# Patient Record
Sex: FEMALE | HISPANIC OR LATINO | Employment: UNEMPLOYED | URBAN - METROPOLITAN AREA
[De-identification: names, ages, dates, MRNs, and addresses within clinical notes are randomized per-mention and may not be internally consistent; named-entity substitution may affect disease eponyms.]

---

## 2021-07-26 ENCOUNTER — APPOINTMENT (OUTPATIENT)
Dept: GENERAL RADIOLOGY | Facility: HOSPITAL | Age: 41
End: 2021-07-26

## 2021-07-26 LAB
ALBUMIN SERPL-MCNC: 3.9 G/DL (ref 3.5–5.2)
ALBUMIN/GLOB SERPL: 1.3 G/DL
ALP SERPL-CCNC: 85 U/L (ref 39–117)
ALT SERPL W P-5'-P-CCNC: 31 U/L (ref 1–33)
ANION GAP SERPL CALCULATED.3IONS-SCNC: 11.1 MMOL/L (ref 5–15)
AST SERPL-CCNC: 19 U/L (ref 1–32)
BASOPHILS # BLD AUTO: 0.04 10*3/MM3 (ref 0–0.2)
BASOPHILS NFR BLD AUTO: 0.4 % (ref 0–1.5)
BILIRUB SERPL-MCNC: 0.2 MG/DL (ref 0–1.2)
BUN SERPL-MCNC: 9 MG/DL (ref 6–20)
BUN/CREAT SERPL: 17.3 (ref 7–25)
CALCIUM SPEC-SCNC: 8.4 MG/DL (ref 8.6–10.5)
CHLORIDE SERPL-SCNC: 100 MMOL/L (ref 98–107)
CO2 SERPL-SCNC: 25.9 MMOL/L (ref 22–29)
CREAT SERPL-MCNC: 0.52 MG/DL (ref 0.57–1)
DEPRECATED RDW RBC AUTO: 40.8 FL (ref 37–54)
EOSINOPHIL # BLD AUTO: 0.09 10*3/MM3 (ref 0–0.4)
EOSINOPHIL NFR BLD AUTO: 0.9 % (ref 0.3–6.2)
ERYTHROCYTE [DISTWIDTH] IN BLOOD BY AUTOMATED COUNT: 13.2 % (ref 12.3–15.4)
GFR SERPL CREATININE-BSD FRML MDRD: 131 ML/MIN/1.73
GFR SERPL CREATININE-BSD FRML MDRD: >150 ML/MIN/1.73
GLOBULIN UR ELPH-MCNC: 3 GM/DL
GLUCOSE SERPL-MCNC: 220 MG/DL (ref 65–99)
HCG SERPL QL: NEGATIVE
HCT VFR BLD AUTO: 35.5 % (ref 34–46.6)
HGB BLD-MCNC: 11.9 G/DL (ref 12–15.9)
HOLD SPECIMEN: NORMAL
IMM GRANULOCYTES # BLD AUTO: 0.03 10*3/MM3 (ref 0–0.05)
IMM GRANULOCYTES NFR BLD AUTO: 0.3 % (ref 0–0.5)
LYMPHOCYTES # BLD AUTO: 3.37 10*3/MM3 (ref 0.7–3.1)
LYMPHOCYTES NFR BLD AUTO: 35.5 % (ref 19.6–45.3)
MCH RBC QN AUTO: 28.3 PG (ref 26.6–33)
MCHC RBC AUTO-ENTMCNC: 33.5 G/DL (ref 31.5–35.7)
MCV RBC AUTO: 84.5 FL (ref 79–97)
MONOCYTES # BLD AUTO: 0.47 10*3/MM3 (ref 0.1–0.9)
MONOCYTES NFR BLD AUTO: 4.9 % (ref 5–12)
NEUTROPHILS NFR BLD AUTO: 5.5 10*3/MM3 (ref 1.7–7)
NEUTROPHILS NFR BLD AUTO: 58 % (ref 42.7–76)
NRBC BLD AUTO-RTO: 0 /100 WBC (ref 0–0.2)
PLATELET # BLD AUTO: 266 10*3/MM3 (ref 140–450)
PMV BLD AUTO: 10 FL (ref 6–12)
POTASSIUM SERPL-SCNC: 3.2 MMOL/L (ref 3.5–5.2)
PROT SERPL-MCNC: 6.9 G/DL (ref 6–8.5)
QT INTERVAL: 364 MS
RBC # BLD AUTO: 4.2 10*6/MM3 (ref 3.77–5.28)
SODIUM SERPL-SCNC: 137 MMOL/L (ref 136–145)
TROPONIN T SERPL-MCNC: <0.01 NG/ML (ref 0–0.03)
WBC # BLD AUTO: 9.5 10*3/MM3 (ref 3.4–10.8)
WHOLE BLOOD HOLD SPECIMEN: NORMAL

## 2021-07-26 PROCEDURE — 84484 ASSAY OF TROPONIN QUANT: CPT | Performed by: EMERGENCY MEDICINE

## 2021-07-26 PROCEDURE — 71046 X-RAY EXAM CHEST 2 VIEWS: CPT

## 2021-07-26 PROCEDURE — 84703 CHORIONIC GONADOTROPIN ASSAY: CPT | Performed by: EMERGENCY MEDICINE

## 2021-07-26 PROCEDURE — 93010 ELECTROCARDIOGRAM REPORT: CPT | Performed by: INTERNAL MEDICINE

## 2021-07-26 PROCEDURE — 85025 COMPLETE CBC W/AUTO DIFF WBC: CPT | Performed by: EMERGENCY MEDICINE

## 2021-07-26 PROCEDURE — 93005 ELECTROCARDIOGRAM TRACING: CPT

## 2021-07-26 PROCEDURE — 80053 COMPREHEN METABOLIC PANEL: CPT | Performed by: EMERGENCY MEDICINE

## 2021-07-26 PROCEDURE — 99284 EMERGENCY DEPT VISIT MOD MDM: CPT

## 2021-07-26 PROCEDURE — 93005 ELECTROCARDIOGRAM TRACING: CPT | Performed by: EMERGENCY MEDICINE

## 2021-07-26 RX ORDER — SODIUM CHLORIDE 0.9 % (FLUSH) 0.9 %
10 SYRINGE (ML) INJECTION AS NEEDED
Status: DISCONTINUED | OUTPATIENT
Start: 2021-07-26 | End: 2021-07-29 | Stop reason: HOSPADM

## 2021-07-26 RX ORDER — ASPIRIN 325 MG
325 TABLET ORAL ONCE
Status: DISCONTINUED | OUTPATIENT
Start: 2021-07-26 | End: 2021-07-27

## 2021-07-27 ENCOUNTER — APPOINTMENT (OUTPATIENT)
Dept: CT IMAGING | Facility: HOSPITAL | Age: 41
End: 2021-07-27

## 2021-07-27 ENCOUNTER — HOSPITAL ENCOUNTER (INPATIENT)
Facility: HOSPITAL | Age: 41
LOS: 2 days | Discharge: HOME | End: 2021-07-29
Attending: EMERGENCY MEDICINE | Admitting: INTERNAL MEDICINE

## 2021-07-27 ENCOUNTER — APPOINTMENT (OUTPATIENT)
Dept: MRI IMAGING | Facility: HOSPITAL | Age: 41
End: 2021-07-27

## 2021-07-27 DIAGNOSIS — G93.89 BRAIN MASS: Primary | ICD-10-CM

## 2021-07-27 DIAGNOSIS — R29.810 FACIAL DROOP: ICD-10-CM

## 2021-07-27 DIAGNOSIS — I10 ESSENTIAL HYPERTENSION: ICD-10-CM

## 2021-07-27 DIAGNOSIS — E78.5 HYPERLIPIDEMIA, UNSPECIFIED HYPERLIPIDEMIA TYPE: ICD-10-CM

## 2021-07-27 DIAGNOSIS — R20.0 LEFT ARM NUMBNESS: ICD-10-CM

## 2021-07-27 DIAGNOSIS — E11.9 TYPE 2 DIABETES MELLITUS WITHOUT COMPLICATION, WITHOUT LONG-TERM CURRENT USE OF INSULIN (HCC): ICD-10-CM

## 2021-07-27 DIAGNOSIS — R47.81 SLURRED SPEECH: ICD-10-CM

## 2021-07-27 PROBLEM — I63.9 ACUTE CVA (CEREBROVASCULAR ACCIDENT) (HCC): Status: ACTIVE | Noted: 2021-07-27

## 2021-07-27 LAB
ALBUMIN SERPL-MCNC: 3.8 G/DL (ref 3.5–5.2)
ALBUMIN/GLOB SERPL: 1.3 G/DL
ALP SERPL-CCNC: 89 U/L (ref 39–117)
ALT SERPL W P-5'-P-CCNC: 36 U/L (ref 1–33)
ANION GAP SERPL CALCULATED.3IONS-SCNC: 12.7 MMOL/L (ref 5–15)
ANION GAP SERPL CALCULATED.3IONS-SCNC: 9.5 MMOL/L (ref 5–15)
AST SERPL-CCNC: 25 U/L (ref 1–32)
BASOPHILS # BLD AUTO: 0.03 10*3/MM3 (ref 0–0.2)
BASOPHILS NFR BLD AUTO: 0.3 % (ref 0–1.5)
BILIRUB SERPL-MCNC: 0.2 MG/DL (ref 0–1.2)
BUN SERPL-MCNC: 8 MG/DL (ref 6–20)
BUN SERPL-MCNC: 9 MG/DL (ref 6–20)
BUN/CREAT SERPL: 14.5 (ref 7–25)
BUN/CREAT SERPL: 17 (ref 7–25)
CALCIUM SPEC-SCNC: 8.5 MG/DL (ref 8.6–10.5)
CALCIUM SPEC-SCNC: 8.7 MG/DL (ref 8.6–10.5)
CHLORIDE SERPL-SCNC: 101 MMOL/L (ref 98–107)
CHLORIDE SERPL-SCNC: 101 MMOL/L (ref 98–107)
CO2 SERPL-SCNC: 21.3 MMOL/L (ref 22–29)
CO2 SERPL-SCNC: 23.5 MMOL/L (ref 22–29)
CREAT SERPL-MCNC: 0.47 MG/DL (ref 0.57–1)
CREAT SERPL-MCNC: 0.62 MG/DL (ref 0.57–1)
DEPRECATED RDW RBC AUTO: 40 FL (ref 37–54)
DEPRECATED RDW RBC AUTO: 42.3 FL (ref 37–54)
EOSINOPHIL # BLD AUTO: 0.01 10*3/MM3 (ref 0–0.4)
EOSINOPHIL NFR BLD AUTO: 0.1 % (ref 0.3–6.2)
ERYTHROCYTE [DISTWIDTH] IN BLOOD BY AUTOMATED COUNT: 13 % (ref 12.3–15.4)
ERYTHROCYTE [DISTWIDTH] IN BLOOD BY AUTOMATED COUNT: 13.1 % (ref 12.3–15.4)
GFR SERPL CREATININE-BSD FRML MDRD: 107 ML/MIN/1.73
GFR SERPL CREATININE-BSD FRML MDRD: 129 ML/MIN/1.73
GFR SERPL CREATININE-BSD FRML MDRD: 147 ML/MIN/1.73
GFR SERPL CREATININE-BSD FRML MDRD: >150 ML/MIN/1.73
GLOBULIN UR ELPH-MCNC: 2.9 GM/DL
GLUCOSE BLDC GLUCOMTR-MCNC: 270 MG/DL (ref 70–130)
GLUCOSE BLDC GLUCOMTR-MCNC: 279 MG/DL (ref 70–130)
GLUCOSE BLDC GLUCOMTR-MCNC: 302 MG/DL (ref 70–130)
GLUCOSE BLDC GLUCOMTR-MCNC: 304 MG/DL (ref 70–130)
GLUCOSE SERPL-MCNC: 270 MG/DL (ref 65–99)
GLUCOSE SERPL-MCNC: 293 MG/DL (ref 65–99)
HCT VFR BLD AUTO: 38.2 % (ref 34–46.6)
HCT VFR BLD AUTO: 39.1 % (ref 34–46.6)
HGB BLD-MCNC: 12.3 G/DL (ref 12–15.9)
HGB BLD-MCNC: 12.7 G/DL (ref 12–15.9)
IMM GRANULOCYTES # BLD AUTO: 0.05 10*3/MM3 (ref 0–0.05)
IMM GRANULOCYTES NFR BLD AUTO: 0.6 % (ref 0–0.5)
INR PPP: 1.12 (ref 0.9–1.1)
LYMPHOCYTES # BLD AUTO: 1.42 10*3/MM3 (ref 0.7–3.1)
LYMPHOCYTES NFR BLD AUTO: 15.6 % (ref 19.6–45.3)
MCH RBC QN AUTO: 27.6 PG (ref 26.6–33)
MCH RBC QN AUTO: 28.5 PG (ref 26.6–33)
MCHC RBC AUTO-ENTMCNC: 31.5 G/DL (ref 31.5–35.7)
MCHC RBC AUTO-ENTMCNC: 33.2 G/DL (ref 31.5–35.7)
MCV RBC AUTO: 85.7 FL (ref 79–97)
MCV RBC AUTO: 87.7 FL (ref 79–97)
MONOCYTES # BLD AUTO: 0.13 10*3/MM3 (ref 0.1–0.9)
MONOCYTES NFR BLD AUTO: 1.4 % (ref 5–12)
NEUTROPHILS NFR BLD AUTO: 7.44 10*3/MM3 (ref 1.7–7)
NEUTROPHILS NFR BLD AUTO: 82 % (ref 42.7–76)
NRBC BLD AUTO-RTO: 0 /100 WBC (ref 0–0.2)
PLATELET # BLD AUTO: 286 10*3/MM3 (ref 140–450)
PLATELET # BLD AUTO: 299 10*3/MM3 (ref 140–450)
PMV BLD AUTO: 10.3 FL (ref 6–12)
PMV BLD AUTO: 9.8 FL (ref 6–12)
POTASSIUM SERPL-SCNC: 3.7 MMOL/L (ref 3.5–5.2)
POTASSIUM SERPL-SCNC: 4 MMOL/L (ref 3.5–5.2)
PROT SERPL-MCNC: 6.7 G/DL (ref 6–8.5)
PROTHROMBIN TIME: 14.2 SECONDS (ref 11.7–14.2)
RBC # BLD AUTO: 4.46 10*6/MM3 (ref 3.77–5.28)
RBC # BLD AUTO: 4.46 10*6/MM3 (ref 3.77–5.28)
SARS-COV-2 ORF1AB RESP QL NAA+PROBE: NOT DETECTED
SODIUM SERPL-SCNC: 134 MMOL/L (ref 136–145)
SODIUM SERPL-SCNC: 135 MMOL/L (ref 136–145)
TROPONIN T SERPL-MCNC: <0.01 NG/ML (ref 0–0.03)
WBC # BLD AUTO: 13.66 10*3/MM3 (ref 3.4–10.8)
WBC # BLD AUTO: 9.08 10*3/MM3 (ref 3.4–10.8)

## 2021-07-27 PROCEDURE — 70496 CT ANGIOGRAPHY HEAD: CPT

## 2021-07-27 PROCEDURE — A9577 INJ MULTIHANCE: HCPCS | Performed by: INTERNAL MEDICINE

## 2021-07-27 PROCEDURE — 25010000002 DEXAMETHASONE SODIUM PHOSPHATE 10 MG/ML SOLUTION: Performed by: PHYSICIAN ASSISTANT

## 2021-07-27 PROCEDURE — 99253 IP/OBS CNSLTJ NEW/EST LOW 45: CPT | Performed by: NURSE PRACTITIONER

## 2021-07-27 PROCEDURE — 70553 MRI BRAIN STEM W/O & W/DYE: CPT

## 2021-07-27 PROCEDURE — 85610 PROTHROMBIN TIME: CPT | Performed by: NURSE PRACTITIONER

## 2021-07-27 PROCEDURE — 70498 CT ANGIOGRAPHY NECK: CPT

## 2021-07-27 PROCEDURE — 82962 GLUCOSE BLOOD TEST: CPT

## 2021-07-27 PROCEDURE — 0 GADOBENATE DIMEGLUMINE 529 MG/ML SOLUTION: Performed by: INTERNAL MEDICINE

## 2021-07-27 PROCEDURE — 70450 CT HEAD/BRAIN W/O DYE: CPT

## 2021-07-27 PROCEDURE — 25010000003 LEVETIRACETAM IN NACL 0.75% 1000 MG/100ML SOLUTION: Performed by: NURSE PRACTITIONER

## 2021-07-27 PROCEDURE — 25010000003 LEVETIRACETAM IN NACL 0.75% 1000 MG/100ML SOLUTION: Performed by: PHYSICIAN ASSISTANT

## 2021-07-27 PROCEDURE — 63710000001 INSULIN LISPRO (HUMAN) PER 5 UNITS: Performed by: NURSE PRACTITIONER

## 2021-07-27 PROCEDURE — 92610 EVALUATE SWALLOWING FUNCTION: CPT

## 2021-07-27 PROCEDURE — 80053 COMPREHEN METABOLIC PANEL: CPT | Performed by: NURSE PRACTITIONER

## 2021-07-27 PROCEDURE — 74177 CT ABD & PELVIS W/CONTRAST: CPT

## 2021-07-27 PROCEDURE — U0004 COV-19 TEST NON-CDC HGH THRU: HCPCS | Performed by: PHYSICIAN ASSISTANT

## 2021-07-27 PROCEDURE — 25010000002 DEXAMETHASONE SODIUM PHOSPHATE 10 MG/ML SOLUTION: Performed by: NURSE PRACTITIONER

## 2021-07-27 PROCEDURE — 63710000001 INSULIN GLARGINE PER 5 UNITS: Performed by: INTERNAL MEDICINE

## 2021-07-27 PROCEDURE — 85025 COMPLETE CBC W/AUTO DIFF WBC: CPT | Performed by: NURSE PRACTITIONER

## 2021-07-27 PROCEDURE — 25010000002 IOPAMIDOL 61 % SOLUTION: Performed by: INTERNAL MEDICINE

## 2021-07-27 PROCEDURE — 36415 COLL VENOUS BLD VENIPUNCTURE: CPT | Performed by: NURSE PRACTITIONER

## 2021-07-27 PROCEDURE — 85027 COMPLETE CBC AUTOMATED: CPT | Performed by: INTERNAL MEDICINE

## 2021-07-27 PROCEDURE — 84484 ASSAY OF TROPONIN QUANT: CPT | Performed by: EMERGENCY MEDICINE

## 2021-07-27 RX ORDER — SODIUM CHLORIDE 9 MG/ML
125 INJECTION, SOLUTION INTRAVENOUS CONTINUOUS
Status: DISCONTINUED | OUTPATIENT
Start: 2021-07-27 | End: 2021-07-29 | Stop reason: HOSPADM

## 2021-07-27 RX ORDER — DEXTROSE MONOHYDRATE 25 G/50ML
25 INJECTION, SOLUTION INTRAVENOUS
Status: DISCONTINUED | OUTPATIENT
Start: 2021-07-27 | End: 2021-07-29 | Stop reason: HOSPADM

## 2021-07-27 RX ORDER — ASPIRIN 325 MG
325 TABLET ORAL DAILY
Status: DISCONTINUED | OUTPATIENT
Start: 2021-07-27 | End: 2021-07-28

## 2021-07-27 RX ORDER — LEVETIRACETAM 10 MG/ML
1000 INJECTION INTRAVASCULAR EVERY 12 HOURS SCHEDULED
Status: DISCONTINUED | OUTPATIENT
Start: 2021-07-27 | End: 2021-07-28

## 2021-07-27 RX ORDER — AMLODIPINE BESYLATE 10 MG/1
10 TABLET ORAL DAILY
COMMUNITY

## 2021-07-27 RX ORDER — NICOTINE POLACRILEX 4 MG
15 LOZENGE BUCCAL
Status: DISCONTINUED | OUTPATIENT
Start: 2021-07-27 | End: 2021-07-29 | Stop reason: HOSPADM

## 2021-07-27 RX ORDER — PANTOPRAZOLE SODIUM 40 MG/1
40 TABLET, DELAYED RELEASE ORAL EVERY MORNING
Status: DISCONTINUED | OUTPATIENT
Start: 2021-07-28 | End: 2021-07-29 | Stop reason: HOSPADM

## 2021-07-27 RX ORDER — OMEPRAZOLE 40 MG/1
40 CAPSULE, DELAYED RELEASE ORAL
COMMUNITY
Start: 2021-07-18 | End: 2021-11-10

## 2021-07-27 RX ORDER — LOSARTAN POTASSIUM AND HYDROCHLOROTHIAZIDE 12.5; 5 MG/1; MG/1
TABLET ORAL
COMMUNITY
Start: 2021-06-15

## 2021-07-27 RX ORDER — ASPIRIN 300 MG/1
300 SUPPOSITORY RECTAL DAILY
Status: DISCONTINUED | OUTPATIENT
Start: 2021-07-27 | End: 2021-07-28

## 2021-07-27 RX ORDER — SODIUM CHLORIDE 0.9 % (FLUSH) 0.9 %
10 SYRINGE (ML) INJECTION AS NEEDED
Status: DISCONTINUED | OUTPATIENT
Start: 2021-07-27 | End: 2021-07-27

## 2021-07-27 RX ORDER — DEXAMETHASONE SODIUM PHOSPHATE 10 MG/ML
6 INJECTION, SOLUTION INTRAMUSCULAR; INTRAVENOUS EVERY 6 HOURS
Status: DISCONTINUED | OUTPATIENT
Start: 2021-07-27 | End: 2021-07-28

## 2021-07-27 RX ORDER — SODIUM CHLORIDE 0.9 % (FLUSH) 0.9 %
10 SYRINGE (ML) INJECTION EVERY 12 HOURS SCHEDULED
Status: DISCONTINUED | OUTPATIENT
Start: 2021-07-27 | End: 2021-07-27

## 2021-07-27 RX ORDER — LEVETIRACETAM 10 MG/ML
1000 INJECTION INTRAVASCULAR ONCE
Status: COMPLETED | OUTPATIENT
Start: 2021-07-27 | End: 2021-07-27

## 2021-07-27 RX ORDER — ATORVASTATIN CALCIUM 80 MG/1
80 TABLET, FILM COATED ORAL NIGHTLY
Status: DISCONTINUED | OUTPATIENT
Start: 2021-07-27 | End: 2021-07-29 | Stop reason: HOSPADM

## 2021-07-27 RX ORDER — INSULIN LISPRO 100 [IU]/ML
0-14 INJECTION, SOLUTION INTRAVENOUS; SUBCUTANEOUS
Status: DISCONTINUED | OUTPATIENT
Start: 2021-07-27 | End: 2021-07-29

## 2021-07-27 RX ORDER — AMLODIPINE BESYLATE 10 MG/1
10 TABLET ORAL DAILY
Status: DISCONTINUED | OUTPATIENT
Start: 2021-07-27 | End: 2021-07-29 | Stop reason: HOSPADM

## 2021-07-27 RX ORDER — NICOTINE POLACRILEX 4 MG
15 LOZENGE BUCCAL
Status: DISCONTINUED | OUTPATIENT
Start: 2021-07-27 | End: 2021-07-27

## 2021-07-27 RX ORDER — DEXTROSE MONOHYDRATE 25 G/50ML
25 INJECTION, SOLUTION INTRAVENOUS
Status: DISCONTINUED | OUTPATIENT
Start: 2021-07-27 | End: 2021-07-27

## 2021-07-27 RX ORDER — INSULIN LISPRO 100 [IU]/ML
0-7 INJECTION, SOLUTION INTRAVENOUS; SUBCUTANEOUS
Status: DISCONTINUED | OUTPATIENT
Start: 2021-07-27 | End: 2021-07-27

## 2021-07-27 RX ORDER — SODIUM CHLORIDE 0.9 % (FLUSH) 0.9 %
10 SYRINGE (ML) INJECTION EVERY 12 HOURS SCHEDULED
Status: DISCONTINUED | OUTPATIENT
Start: 2021-07-27 | End: 2021-07-29 | Stop reason: HOSPADM

## 2021-07-27 RX ORDER — ACETAMINOPHEN 325 MG/1
650 TABLET ORAL EVERY 4 HOURS PRN
Status: DISCONTINUED | OUTPATIENT
Start: 2021-07-27 | End: 2021-07-29 | Stop reason: HOSPADM

## 2021-07-27 RX ORDER — NITROGLYCERIN 0.4 MG/1
0.4 TABLET SUBLINGUAL
Status: DISCONTINUED | OUTPATIENT
Start: 2021-07-27 | End: 2021-07-28 | Stop reason: SDUPTHER

## 2021-07-27 RX ORDER — ATORVASTATIN CALCIUM 20 MG/1
20 TABLET, FILM COATED ORAL DAILY
Status: DISCONTINUED | OUTPATIENT
Start: 2021-07-27 | End: 2021-07-27

## 2021-07-27 RX ORDER — ONDANSETRON 2 MG/ML
4 INJECTION INTRAMUSCULAR; INTRAVENOUS EVERY 6 HOURS PRN
Status: DISCONTINUED | OUTPATIENT
Start: 2021-07-27 | End: 2021-07-29 | Stop reason: HOSPADM

## 2021-07-27 RX ORDER — SODIUM CHLORIDE 0.9 % (FLUSH) 0.9 %
10 SYRINGE (ML) INJECTION AS NEEDED
Status: DISCONTINUED | OUTPATIENT
Start: 2021-07-27 | End: 2021-07-29 | Stop reason: HOSPADM

## 2021-07-27 RX ORDER — INSULIN GLARGINE 100 [IU]/ML
20 INJECTION, SOLUTION SUBCUTANEOUS NIGHTLY
Status: DISCONTINUED | OUTPATIENT
Start: 2021-07-27 | End: 2021-07-29 | Stop reason: HOSPADM

## 2021-07-27 RX ORDER — DEXAMETHASONE SODIUM PHOSPHATE 10 MG/ML
8 INJECTION, SOLUTION INTRAMUSCULAR; INTRAVENOUS ONCE
Status: COMPLETED | OUTPATIENT
Start: 2021-07-27 | End: 2021-07-27

## 2021-07-27 RX ADMIN — AMLODIPINE BESYLATE 10 MG: 10 TABLET ORAL at 16:17

## 2021-07-27 RX ADMIN — IOPAMIDOL 85 ML: 612 INJECTION, SOLUTION INTRAVENOUS at 10:30

## 2021-07-27 RX ADMIN — LEVETIRACETAM 1000 MG: 1000 INJECTION, SOLUTION INTRAVENOUS at 03:15

## 2021-07-27 RX ADMIN — LOSARTAN POTASSIUM: 50 TABLET, FILM COATED ORAL at 16:17

## 2021-07-27 RX ADMIN — SODIUM CHLORIDE, PRESERVATIVE FREE 10 ML: 5 INJECTION INTRAVENOUS at 10:11

## 2021-07-27 RX ADMIN — LEVETIRACETAM 1000 MG: 1000 INJECTION, SOLUTION INTRAVENOUS at 10:10

## 2021-07-27 RX ADMIN — ASPIRIN 325 MG: 325 TABLET ORAL at 16:17

## 2021-07-27 RX ADMIN — GADOBENATE DIMEGLUMINE 20 ML: 529 INJECTION, SOLUTION INTRAVENOUS at 14:05

## 2021-07-27 RX ADMIN — DEXAMETHASONE SODIUM PHOSPHATE 8 MG: 10 INJECTION, SOLUTION INTRAMUSCULAR; INTRAVENOUS at 03:15

## 2021-07-27 RX ADMIN — DEXAMETHASONE SODIUM PHOSPHATE 6 MG: 10 INJECTION, SOLUTION INTRAMUSCULAR; INTRAVENOUS at 10:51

## 2021-07-27 RX ADMIN — INSULIN LISPRO 5 UNITS: 100 INJECTION, SOLUTION INTRAVENOUS; SUBCUTANEOUS at 17:58

## 2021-07-27 RX ADMIN — DEXAMETHASONE SODIUM PHOSPHATE 6 MG: 10 INJECTION, SOLUTION INTRAMUSCULAR; INTRAVENOUS at 16:17

## 2021-07-27 RX ADMIN — INSULIN GLARGINE 20 UNITS: 100 INJECTION, SOLUTION SUBCUTANEOUS at 21:38

## 2021-07-27 RX ADMIN — LEVETIRACETAM 1000 MG: 1000 INJECTION, SOLUTION INTRAVENOUS at 21:33

## 2021-07-27 RX ADMIN — ATORVASTATIN CALCIUM 80 MG: 80 TABLET, FILM COATED ORAL at 21:33

## 2021-07-27 RX ADMIN — INSULIN LISPRO 4 UNITS: 100 INJECTION, SOLUTION INTRAVENOUS; SUBCUTANEOUS at 10:11

## 2021-07-27 RX ADMIN — SODIUM CHLORIDE 100 ML/HR: 9 INJECTION, SOLUTION INTRAVENOUS at 16:17

## 2021-07-27 RX ADMIN — SODIUM CHLORIDE, PRESERVATIVE FREE 10 ML: 5 INJECTION INTRAVENOUS at 21:36

## 2021-07-27 RX ADMIN — DEXAMETHASONE SODIUM PHOSPHATE 6 MG: 10 INJECTION, SOLUTION INTRAMUSCULAR; INTRAVENOUS at 21:35

## 2021-07-27 RX ADMIN — IOPAMIDOL 85 ML: 612 INJECTION, SOLUTION INTRAVENOUS at 23:43

## 2021-07-27 RX ADMIN — ACETAMINOPHEN 650 MG: 325 TABLET, FILM COATED ORAL at 21:33

## 2021-07-28 ENCOUNTER — APPOINTMENT (OUTPATIENT)
Dept: CARDIOLOGY | Facility: HOSPITAL | Age: 41
End: 2021-07-28

## 2021-07-28 ENCOUNTER — APPOINTMENT (OUTPATIENT)
Dept: MRI IMAGING | Facility: HOSPITAL | Age: 41
End: 2021-07-28

## 2021-07-28 PROBLEM — R93.2 ABNORMAL LIVER CT: Status: ACTIVE | Noted: 2021-07-28

## 2021-07-28 LAB
ALBUMIN SERPL-MCNC: 3.7 G/DL (ref 3.5–5.2)
ALBUMIN/GLOB SERPL: 1.3 G/DL
ALP SERPL-CCNC: 83 U/L (ref 39–117)
ALT SERPL W P-5'-P-CCNC: 29 U/L (ref 1–33)
ANION GAP SERPL CALCULATED.3IONS-SCNC: 11.6 MMOL/L (ref 5–15)
AORTIC DIMENSIONLESS INDEX: 0.9 (DI)
ASCENDING AORTA: 3 CM
AST SERPL-CCNC: 14 U/L (ref 1–32)
BH CV ECHO MEAS - ACS: 2.1 CM
BH CV ECHO MEAS - AO MAX PG (FULL): 3.7 MMHG
BH CV ECHO MEAS - AO MAX PG: 9.6 MMHG
BH CV ECHO MEAS - AO MEAN PG (FULL): 2 MMHG
BH CV ECHO MEAS - AO MEAN PG: 5 MMHG
BH CV ECHO MEAS - AO ROOT AREA (BSA CORRECTED): 1.3
BH CV ECHO MEAS - AO ROOT AREA: 5.7 CM^2
BH CV ECHO MEAS - AO ROOT DIAM: 2.7 CM
BH CV ECHO MEAS - AO V2 MAX: 155 CM/SEC
BH CV ECHO MEAS - AO V2 MEAN: 108 CM/SEC
BH CV ECHO MEAS - AO V2 VTI: 34.1 CM
BH CV ECHO MEAS - ASC AORTA: 3 CM
BH CV ECHO MEAS - AVA(I,A): 2.7 CM^2
BH CV ECHO MEAS - AVA(I,D): 2.7 CM^2
BH CV ECHO MEAS - AVA(V,A): 2.7 CM^2
BH CV ECHO MEAS - AVA(V,D): 2.7 CM^2
BH CV ECHO MEAS - BSA(HAYCOCK): 2.3 M^2
BH CV ECHO MEAS - BSA: 2.1 M^2
BH CV ECHO MEAS - BZI_BMI: 41.2 KILOGRAMS/M^2
BH CV ECHO MEAS - BZI_METRIC_HEIGHT: 162.6 CM
BH CV ECHO MEAS - BZI_METRIC_WEIGHT: 108.9 KG
BH CV ECHO MEAS - EDV(CUBED): 140.6 ML
BH CV ECHO MEAS - EDV(MOD-SP2): 114 ML
BH CV ECHO MEAS - EDV(MOD-SP4): 120 ML
BH CV ECHO MEAS - EDV(TEICH): 129.5 ML
BH CV ECHO MEAS - EF(CUBED): 78.8 %
BH CV ECHO MEAS - EF(MOD-BP): 59.6 %
BH CV ECHO MEAS - EF(MOD-SP2): 64 %
BH CV ECHO MEAS - EF(MOD-SP4): 58.3 %
BH CV ECHO MEAS - EF(TEICH): 70.7 %
BH CV ECHO MEAS - ESV(CUBED): 29.8 ML
BH CV ECHO MEAS - ESV(MOD-SP2): 41 ML
BH CV ECHO MEAS - ESV(MOD-SP4): 50 ML
BH CV ECHO MEAS - ESV(TEICH): 37.9 ML
BH CV ECHO MEAS - FS: 40.4 %
BH CV ECHO MEAS - IVS/LVPW: 1.1
BH CV ECHO MEAS - IVSD: 1.1 CM
BH CV ECHO MEAS - LAT PEAK E' VEL: 10 CM/SEC
BH CV ECHO MEAS - LV DIASTOLIC VOL/BSA (35-75): 56.8 ML/M^2
BH CV ECHO MEAS - LV MASS(C)D: 234.6 GRAMS
BH CV ECHO MEAS - LV MASS(C)DI: 111 GRAMS/M^2
BH CV ECHO MEAS - LV MAX PG: 6 MMHG
BH CV ECHO MEAS - LV MEAN PG: 3 MMHG
BH CV ECHO MEAS - LV SYSTOLIC VOL/BSA (12-30): 23.7 ML/M^2
BH CV ECHO MEAS - LV V1 MAX: 122 CM/SEC
BH CV ECHO MEAS - LV V1 MEAN: 82.2 CM/SEC
BH CV ECHO MEAS - LV V1 VTI: 26.7 CM
BH CV ECHO MEAS - LVIDD: 5.2 CM
BH CV ECHO MEAS - LVIDS: 3.1 CM
BH CV ECHO MEAS - LVLD AP2: 8.1 CM
BH CV ECHO MEAS - LVLD AP4: 8.2 CM
BH CV ECHO MEAS - LVLS AP2: 6.4 CM
BH CV ECHO MEAS - LVLS AP4: 7 CM
BH CV ECHO MEAS - LVOT AREA (M): 3.5 CM^2
BH CV ECHO MEAS - LVOT AREA: 3.5 CM^2
BH CV ECHO MEAS - LVOT DIAM: 2.1 CM
BH CV ECHO MEAS - LVPWD: 1.1 CM
BH CV ECHO MEAS - MED PEAK E' VEL: 11.1 CM/SEC
BH CV ECHO MEAS - MV A DUR: 0.2 SEC
BH CV ECHO MEAS - MV A MAX VEL: 63.8 CM/SEC
BH CV ECHO MEAS - MV DEC SLOPE: 438 CM/SEC^2
BH CV ECHO MEAS - MV DEC TIME: 246 SEC
BH CV ECHO MEAS - MV E MAX VEL: 93.8 CM/SEC
BH CV ECHO MEAS - MV E/A: 1.5
BH CV ECHO MEAS - MV MAX PG: 5.2 MMHG
BH CV ECHO MEAS - MV MEAN PG: 2 MMHG
BH CV ECHO MEAS - MV P1/2T MAX VEL: 127 CM/SEC
BH CV ECHO MEAS - MV P1/2T: 84.9 MSEC
BH CV ECHO MEAS - MV V2 MAX: 114 CM/SEC
BH CV ECHO MEAS - MV V2 MEAN: 68 CM/SEC
BH CV ECHO MEAS - MV V2 VTI: 32.8 CM
BH CV ECHO MEAS - MVA P1/2T LCG: 1.7 CM^2
BH CV ECHO MEAS - MVA(P1/2T): 2.6 CM^2
BH CV ECHO MEAS - MVA(VTI): 2.8 CM^2
BH CV ECHO MEAS - PA ACC TIME: 0.07 SEC
BH CV ECHO MEAS - PA MAX PG (FULL): 2.1 MMHG
BH CV ECHO MEAS - PA MAX PG: 4.5 MMHG
BH CV ECHO MEAS - PA PR(ACCEL): 48 MMHG
BH CV ECHO MEAS - PA V2 MAX: 106 CM/SEC
BH CV ECHO MEAS - PULM A REVS DUR: 0.1 SEC
BH CV ECHO MEAS - PULM A REVS VEL: 25.7 CM/SEC
BH CV ECHO MEAS - PULM DIAS VEL: 42.4 CM/SEC
BH CV ECHO MEAS - PULM S/D: 1.4
BH CV ECHO MEAS - PULM SYS VEL: 61.4 CM/SEC
BH CV ECHO MEAS - PVA(V,A): 3.6 CM^2
BH CV ECHO MEAS - PVA(V,D): 3.6 CM^2
BH CV ECHO MEAS - QP/QS: 0.77
BH CV ECHO MEAS - RAP SYSTOLE: 8 MMHG
BH CV ECHO MEAS - RV MAX PG: 2.4 MMHG
BH CV ECHO MEAS - RV MEAN PG: 1 MMHG
BH CV ECHO MEAS - RV V1 MAX: 77 CM/SEC
BH CV ECHO MEAS - RV V1 MEAN: 51.5 CM/SEC
BH CV ECHO MEAS - RV V1 VTI: 14.6 CM
BH CV ECHO MEAS - RVOT AREA: 4.9 CM^2
BH CV ECHO MEAS - RVOT DIAM: 2.5 CM
BH CV ECHO MEAS - RVSP: 27.5 MMHG
BH CV ECHO MEAS - SI(AO): 92.4 ML/M^2
BH CV ECHO MEAS - SI(CUBED): 52.4 ML/M^2
BH CV ECHO MEAS - SI(LVOT): 43.8 ML/M^2
BH CV ECHO MEAS - SI(MOD-SP2): 34.5 ML/M^2
BH CV ECHO MEAS - SI(MOD-SP4): 33.1 ML/M^2
BH CV ECHO MEAS - SI(TEICH): 43.3 ML/M^2
BH CV ECHO MEAS - SV(AO): 195.2 ML
BH CV ECHO MEAS - SV(CUBED): 110.8 ML
BH CV ECHO MEAS - SV(LVOT): 92.5 ML
BH CV ECHO MEAS - SV(MOD-SP2): 73 ML
BH CV ECHO MEAS - SV(MOD-SP4): 70 ML
BH CV ECHO MEAS - SV(RVOT): 71.7 ML
BH CV ECHO MEAS - SV(TEICH): 91.6 ML
BH CV ECHO MEAS - TAPSE (>1.6): 2.3 CM
BH CV ECHO MEAS - TR MAX VEL: 221 CM/SEC
BH CV ECHO MEASUREMENTS AVERAGE E/E' RATIO: 8.89
BH CV VAS BP RIGHT ARM: NORMAL MMHG
BH CV XLRA - RV BASE: 3.7 CM
BH CV XLRA - RV LENGTH: 8.2 CM
BH CV XLRA - RV MID: 2.6 CM
BH CV XLRA - TDI S': 16.4 CM/SEC
BILIRUB SERPL-MCNC: 0.2 MG/DL (ref 0–1.2)
BUN SERPL-MCNC: 8 MG/DL (ref 6–20)
BUN/CREAT SERPL: 17.8 (ref 7–25)
CALCIUM SPEC-SCNC: 8.3 MG/DL (ref 8.6–10.5)
CHLORIDE SERPL-SCNC: 101 MMOL/L (ref 98–107)
CHOLEST SERPL-MCNC: 144 MG/DL (ref 0–200)
CO2 SERPL-SCNC: 23.4 MMOL/L (ref 22–29)
CREAT SERPL-MCNC: 0.45 MG/DL (ref 0.57–1)
DEPRECATED RDW RBC AUTO: 39.1 FL (ref 37–54)
ERYTHROCYTE [DISTWIDTH] IN BLOOD BY AUTOMATED COUNT: 12.7 % (ref 12.3–15.4)
FOLATE SERPL-MCNC: 11.1 NG/ML (ref 4.78–24.2)
GFR SERPL CREATININE-BSD FRML MDRD: >150 ML/MIN/1.73
GFR SERPL CREATININE-BSD FRML MDRD: >150 ML/MIN/1.73
GLOBULIN UR ELPH-MCNC: 2.8 GM/DL
GLUCOSE BLDC GLUCOMTR-MCNC: 273 MG/DL (ref 70–130)
GLUCOSE BLDC GLUCOMTR-MCNC: 283 MG/DL (ref 70–130)
GLUCOSE BLDC GLUCOMTR-MCNC: 317 MG/DL (ref 70–130)
GLUCOSE BLDC GLUCOMTR-MCNC: 344 MG/DL (ref 70–130)
GLUCOSE SERPL-MCNC: 274 MG/DL (ref 65–99)
HBA1C MFR BLD: 8.01 % (ref 4.8–5.6)
HCT VFR BLD AUTO: 35.9 % (ref 34–46.6)
HDLC SERPL-MCNC: 45 MG/DL (ref 40–60)
HGB BLD-MCNC: 11.7 G/DL (ref 12–15.9)
LDLC SERPL CALC-MCNC: 86 MG/DL (ref 0–100)
LDLC/HDLC SERPL: 1.91 {RATIO}
LEFT ATRIUM VOLUME INDEX: 37 ML/M2
MAXIMAL PREDICTED HEART RATE: 180 BPM
MCH RBC QN AUTO: 27.7 PG (ref 26.6–33)
MCHC RBC AUTO-ENTMCNC: 32.6 G/DL (ref 31.5–35.7)
MCV RBC AUTO: 84.9 FL (ref 79–97)
PLATELET # BLD AUTO: 300 10*3/MM3 (ref 140–450)
PMV BLD AUTO: 10.5 FL (ref 6–12)
POTASSIUM SERPL-SCNC: 3.7 MMOL/L (ref 3.5–5.2)
PROT SERPL-MCNC: 6.5 G/DL (ref 6–8.5)
RBC # BLD AUTO: 4.23 10*6/MM3 (ref 3.77–5.28)
SINUS: 2.8 CM
SODIUM SERPL-SCNC: 136 MMOL/L (ref 136–145)
STJ: 2.7 CM
STRESS TARGET HR: 153 BPM
T4 FREE SERPL-MCNC: 1.6 NG/DL (ref 0.93–1.7)
TRIGL SERPL-MCNC: 65 MG/DL (ref 0–150)
TSH SERPL DL<=0.05 MIU/L-ACNC: 0.16 UIU/ML (ref 0.27–4.2)
VIT B12 BLD-MCNC: 453 PG/ML (ref 211–946)
VLDLC SERPL-MCNC: 13 MG/DL (ref 5–40)
WBC # BLD AUTO: 13.34 10*3/MM3 (ref 3.4–10.8)

## 2021-07-28 PROCEDURE — 85027 COMPLETE CBC AUTOMATED: CPT | Performed by: INTERNAL MEDICINE

## 2021-07-28 PROCEDURE — 63710000001 INSULIN LISPRO (HUMAN) PER 5 UNITS: Performed by: INTERNAL MEDICINE

## 2021-07-28 PROCEDURE — 25010000002 IOPAMIDOL 61 % SOLUTION: Performed by: INTERNAL MEDICINE

## 2021-07-28 PROCEDURE — 63710000001 INSULIN GLARGINE PER 5 UNITS: Performed by: INTERNAL MEDICINE

## 2021-07-28 PROCEDURE — 97112 NEUROMUSCULAR REEDUCATION: CPT

## 2021-07-28 PROCEDURE — 93306 TTE W/DOPPLER COMPLETE: CPT | Performed by: INTERNAL MEDICINE

## 2021-07-28 PROCEDURE — 74183 MRI ABD W/O CNTR FLWD CNTR: CPT

## 2021-07-28 PROCEDURE — 80061 LIPID PANEL: CPT | Performed by: INTERNAL MEDICINE

## 2021-07-28 PROCEDURE — 97530 THERAPEUTIC ACTIVITIES: CPT

## 2021-07-28 PROCEDURE — 97166 OT EVAL MOD COMPLEX 45 MIN: CPT

## 2021-07-28 PROCEDURE — A9577 INJ MULTIHANCE: HCPCS | Performed by: STUDENT IN AN ORGANIZED HEALTH CARE EDUCATION/TRAINING PROGRAM

## 2021-07-28 PROCEDURE — 0 GADOBENATE DIMEGLUMINE 529 MG/ML SOLUTION: Performed by: STUDENT IN AN ORGANIZED HEALTH CARE EDUCATION/TRAINING PROGRAM

## 2021-07-28 PROCEDURE — 82607 VITAMIN B-12: CPT | Performed by: PSYCHIATRY & NEUROLOGY

## 2021-07-28 PROCEDURE — 84443 ASSAY THYROID STIM HORMONE: CPT | Performed by: PSYCHIATRY & NEUROLOGY

## 2021-07-28 PROCEDURE — 25010000002 DEXAMETHASONE SODIUM PHOSPHATE 10 MG/ML SOLUTION: Performed by: NURSE PRACTITIONER

## 2021-07-28 PROCEDURE — 82746 ASSAY OF FOLIC ACID SERUM: CPT | Performed by: PSYCHIATRY & NEUROLOGY

## 2021-07-28 PROCEDURE — 99232 SBSQ HOSP IP/OBS MODERATE 35: CPT | Performed by: NURSE PRACTITIONER

## 2021-07-28 PROCEDURE — 80053 COMPREHEN METABOLIC PANEL: CPT | Performed by: INTERNAL MEDICINE

## 2021-07-28 PROCEDURE — 83036 HEMOGLOBIN GLYCOSYLATED A1C: CPT | Performed by: INTERNAL MEDICINE

## 2021-07-28 PROCEDURE — 84439 ASSAY OF FREE THYROXINE: CPT | Performed by: PSYCHIATRY & NEUROLOGY

## 2021-07-28 PROCEDURE — 99222 1ST HOSP IP/OBS MODERATE 55: CPT | Performed by: PSYCHIATRY & NEUROLOGY

## 2021-07-28 PROCEDURE — 82962 GLUCOSE BLOOD TEST: CPT

## 2021-07-28 PROCEDURE — 97162 PT EVAL MOD COMPLEX 30 MIN: CPT

## 2021-07-28 PROCEDURE — 25010000003 LEVETIRACETAM IN NACL 0.75% 1000 MG/100ML SOLUTION: Performed by: NURSE PRACTITIONER

## 2021-07-28 PROCEDURE — 93306 TTE W/DOPPLER COMPLETE: CPT

## 2021-07-28 RX ORDER — ASPIRIN 81 MG/1
81 TABLET, CHEWABLE ORAL DAILY
Status: DISCONTINUED | OUTPATIENT
Start: 2021-07-28 | End: 2021-07-29 | Stop reason: HOSPADM

## 2021-07-28 RX ORDER — NITROGLYCERIN 0.4 MG/1
0.4 TABLET SUBLINGUAL
Status: DISCONTINUED | OUTPATIENT
Start: 2021-07-28 | End: 2021-07-29 | Stop reason: HOSPADM

## 2021-07-28 RX ORDER — CLOPIDOGREL BISULFATE 75 MG/1
75 TABLET ORAL DAILY
Status: DISCONTINUED | OUTPATIENT
Start: 2021-07-28 | End: 2021-07-29 | Stop reason: HOSPADM

## 2021-07-28 RX ADMIN — ATORVASTATIN CALCIUM 80 MG: 80 TABLET, FILM COATED ORAL at 21:23

## 2021-07-28 RX ADMIN — INSULIN LISPRO 10 UNITS: 100 INJECTION, SOLUTION INTRAVENOUS; SUBCUTANEOUS at 17:52

## 2021-07-28 RX ADMIN — PANTOPRAZOLE SODIUM 40 MG: 40 TABLET, DELAYED RELEASE ORAL at 06:21

## 2021-07-28 RX ADMIN — INSULIN LISPRO 8 UNITS: 100 INJECTION, SOLUTION INTRAVENOUS; SUBCUTANEOUS at 08:34

## 2021-07-28 RX ADMIN — AMLODIPINE BESYLATE 10 MG: 10 TABLET ORAL at 08:34

## 2021-07-28 RX ADMIN — DEXAMETHASONE SODIUM PHOSPHATE 6 MG: 10 INJECTION, SOLUTION INTRAMUSCULAR; INTRAVENOUS at 03:16

## 2021-07-28 RX ADMIN — LEVETIRACETAM 1000 MG: 1000 INJECTION, SOLUTION INTRAVENOUS at 08:34

## 2021-07-28 RX ADMIN — ASPIRIN 81 MG: 81 TABLET, CHEWABLE ORAL at 08:34

## 2021-07-28 RX ADMIN — GADOBENATE DIMEGLUMINE 20 ML: 529 INJECTION, SOLUTION INTRAVENOUS at 19:39

## 2021-07-28 RX ADMIN — DEXAMETHASONE SODIUM PHOSPHATE 6 MG: 10 INJECTION, SOLUTION INTRAMUSCULAR; INTRAVENOUS at 08:35

## 2021-07-28 RX ADMIN — LOSARTAN POTASSIUM: 50 TABLET, FILM COATED ORAL at 08:34

## 2021-07-28 RX ADMIN — INSULIN GLARGINE 20 UNITS: 100 INJECTION, SOLUTION SUBCUTANEOUS at 21:23

## 2021-07-28 RX ADMIN — CLOPIDOGREL 75 MG: 75 TABLET, FILM COATED ORAL at 08:34

## 2021-07-28 RX ADMIN — SODIUM CHLORIDE, PRESERVATIVE FREE 10 ML: 5 INJECTION INTRAVENOUS at 08:34

## 2021-07-28 RX ADMIN — INSULIN LISPRO 10 UNITS: 100 INJECTION, SOLUTION INTRAVENOUS; SUBCUTANEOUS at 12:08

## 2021-07-29 ENCOUNTER — TELEPHONE (OUTPATIENT)
Dept: NEUROLOGY | Facility: OTHER | Age: 41
End: 2021-07-29

## 2021-07-29 ENCOUNTER — TELEPHONE (OUTPATIENT)
Dept: NEUROLOGY | Facility: CLINIC | Age: 41
End: 2021-07-29

## 2021-07-29 VITALS
WEIGHT: 240 LBS | HEIGHT: 64 IN | TEMPERATURE: 98 F | HEART RATE: 76 BPM | BODY MASS INDEX: 40.97 KG/M2 | OXYGEN SATURATION: 99 % | RESPIRATION RATE: 16 BRPM | DIASTOLIC BLOOD PRESSURE: 94 MMHG | SYSTOLIC BLOOD PRESSURE: 141 MMHG

## 2021-07-29 PROBLEM — I63.9 ACUTE CVA (CEREBROVASCULAR ACCIDENT) (HCC): Status: RESOLVED | Noted: 2021-07-27 | Resolved: 2021-07-29

## 2021-07-29 LAB
GLUCOSE BLDC GLUCOMTR-MCNC: 249 MG/DL (ref 70–130)
GLUCOSE BLDC GLUCOMTR-MCNC: 294 MG/DL (ref 70–130)

## 2021-07-29 PROCEDURE — 82962 GLUCOSE BLOOD TEST: CPT

## 2021-07-29 PROCEDURE — 63710000001 INSULIN LISPRO (HUMAN) PER 5 UNITS: Performed by: STUDENT IN AN ORGANIZED HEALTH CARE EDUCATION/TRAINING PROGRAM

## 2021-07-29 RX ORDER — ATORVASTATIN CALCIUM 80 MG/1
80 TABLET, FILM COATED ORAL NIGHTLY
Qty: 30 TABLET | Refills: 0 | Status: SHIPPED | OUTPATIENT
Start: 2021-07-29 | End: 2021-08-23 | Stop reason: SDUPTHER

## 2021-07-29 RX ORDER — ASPIRIN 81 MG/1
81 TABLET, CHEWABLE ORAL DAILY
Qty: 30 TABLET | Refills: 0 | Status: SHIPPED | OUTPATIENT
Start: 2021-07-30 | End: 2021-08-23 | Stop reason: SDUPTHER

## 2021-07-29 RX ORDER — CLOPIDOGREL BISULFATE 75 MG/1
75 TABLET ORAL DAILY
Qty: 30 TABLET | Refills: 0 | Status: SHIPPED | OUTPATIENT
Start: 2021-07-30 | End: 2021-08-29

## 2021-07-29 RX ORDER — INSULIN LISPRO 100 [IU]/ML
0-24 INJECTION, SOLUTION INTRAVENOUS; SUBCUTANEOUS
Status: DISCONTINUED | OUTPATIENT
Start: 2021-07-29 | End: 2021-07-29 | Stop reason: HOSPADM

## 2021-07-29 RX ADMIN — LOSARTAN POTASSIUM: 50 TABLET, FILM COATED ORAL at 08:34

## 2021-07-29 RX ADMIN — INSULIN LISPRO 12 UNITS: 100 INJECTION, SOLUTION INTRAVENOUS; SUBCUTANEOUS at 12:41

## 2021-07-29 RX ADMIN — CLOPIDOGREL 75 MG: 75 TABLET, FILM COATED ORAL at 08:34

## 2021-07-29 RX ADMIN — PANTOPRAZOLE SODIUM 40 MG: 40 TABLET, DELAYED RELEASE ORAL at 06:15

## 2021-07-29 RX ADMIN — INSULIN LISPRO 8 UNITS: 100 INJECTION, SOLUTION INTRAVENOUS; SUBCUTANEOUS at 08:35

## 2021-07-29 RX ADMIN — AMLODIPINE BESYLATE 10 MG: 10 TABLET ORAL at 08:34

## 2021-07-29 RX ADMIN — ASPIRIN 81 MG: 81 TABLET, CHEWABLE ORAL at 08:34

## 2021-07-29 RX ADMIN — SODIUM CHLORIDE 125 ML/HR: 9 INJECTION, SOLUTION INTRAVENOUS at 01:07

## 2021-07-29 NOTE — TELEPHONE ENCOUNTER
NURSE FROM HOSPITAL CALLED TO SEE ABOUT SETTING UP APPT . PT WAS NOT SEEN BY OUR HOSPITALIST AND JUST SAID TO FU WITH NEURO OF THEIR CHOICE. ASKED IF WANTED TO SEND ENCOUNTER AND SHE SAID THEY WILL FIND OUT WHERE THEY WANT HER TO GO

## 2021-07-29 NOTE — TELEPHONE ENCOUNTER
Caller: CAREY    Relationship to patient: NURSE ON [facility]PT     Best call back number: [phone]    New or established patient?  [x] New  [] Established    Date of discharge: 7/29/21    Facility discharged from: [facility]    Diagnosis/Symptoms: STROKE    Length of stay (If applicable): 2 DAYS    Specialty Only: Did you see a [facility] provider?    [x] Yes  [] No  If so, who? DR SILVA    THE PT IS SUPPOSE TO BE SEEN IN 1 MONTH. PLEASE ADVISE ON SCHEDULING.

## 2021-08-04 NOTE — PROGRESS NOTES
Subjective   Patient ID: Reena García is a 40 y.o. female is here today for a HFU for an A1,M1 severe stenosis.  Pt is here to discuss a repeat angiogram.  Today pt reports no issues.    40-year-old female with some weakness and tingling in the left hand, she says that she could not handle the remote control.  She says that she ended up going to the emergency room at [facility] on the 15th of June.  She was evaluated for numbness and tingling and she was discharged.  The event only lasted 10 minutes with resolution of the weakness and numbness. The patient had no further symptoms until about 14th July and she ended up going to the emergency room on the 18th when the symptoms did not improve as far as the left side was concerned.  Work-up demonstrated an acute infarct in the basal ganglia with some watershed strokes on the right.  The CTA showed high-grade M1 stenosis on the right with an occluded A1 segment, but patent anterior communicating artery.  Patient was not on any medical therapy at that time and subsequently was placed on Plavix and aspirin plus Lipitor.  Patient has poorly controlled diabetes and is on Metformin.  She returns today to discuss her CTA findings and potential for future endovascular treatment.        The following portions of the patient's history were reviewed and updated as appropriate: allergies, current medications, past family history, past medical history, past social history, past surgical history and problem list.    Review of Systems   Eyes: Negative for visual disturbance.   Musculoskeletal: Negative for gait problem.   Neurological: Negative for dizziness, seizures, syncope, speech difficulty, light-headedness, numbness and headaches.   Psychiatric/Behavioral: Negative for confusion and decreased concentration.       Objective    Vitals:    08/12/21 1100   BP: 130/86   Pulse: 84   Resp: 16   Temp: 97.8 °F (36.6 °C)     Body mass index is  43.26 kg/m².    Physical Exam  Vitals and nursing note reviewed.   Constitutional:       General: She is not in acute distress.     Appearance: She is obese.   HENT:      Head: Normocephalic and atraumatic.      Nose: Nose normal.      Mouth/Throat:      Mouth: Mucous membranes are moist.   Eyes:      Extraocular Movements: Extraocular movements intact.      Conjunctiva/sclera: Conjunctivae normal.      Pupils: Pupils are equal, round, and reactive to light.   Cardiovascular:      Rate and Rhythm: Normal rate.      Pulses: Normal pulses.   Pulmonary:      Effort: Pulmonary effort is normal.   Musculoskeletal:         General: Normal range of motion.      Cervical back: Normal range of motion.   Skin:     General: Skin is warm and dry.   Neurological:      Mental Status: She is alert and oriented to person, place, and time.      Cranial Nerves: No cranial nerve deficit.      Sensory: Sensory deficit present.      Motor: Weakness present.      Coordination: Coordination normal.      Gait: Gait normal.   Psychiatric:         Mood and Affect: Mood normal.         Behavior: Behavior normal.         Thought Content: Thought content normal.         Judgment: Judgment normal.       Neurologic Exam     Mental Status   Oriented to person, place, and time.     Cranial Nerves     CN III, IV, VI   Pupils are equal, round, and reactive to light.      Assessment/Plan   Independent Review of Radiographic Studies:    The CT arteriogram of the head neck vasculature performed on 2021 was reviewed with the patient and shows the high-grade right M1 stenosis and right A1 occlusion.  The MRI from the same date shows the watershed infarct and basal ganglia infarct on the right.  Medical Decision Makin-year-old female with history of diabetes, hyperlipidemia, hypertension and a recent stroke with work-up showing a right high-grade M1 stenosis and a left A1 occlusion.  Patient was not on maximal medical therapy at the time of  her stroke and has since been placed on aspirin and Plavix as well as Lipitor with no recurrent stroke reported.  She has some residual hand weakness and numbness on the left.  She is going to see an endocrinologist for her diabetes which is poorly controlled with oral Metformin.  Endovascular treatment of an intracranial stenosis was reviewed with the patient should she fail maximal medical therapy with a recurrent CVA in the future.  She expressed understanding and is to follow-up with her neurology appointment and endocrinology appointment as scheduled.  Diagnoses and all orders for this visit:    1. Intracranial vascular stenosis (Primary)    2. History of recent stroke    3. Type 2 diabetes mellitus with other circulatory complication, without long-term current use of insulin (CMS/[facility])    4. Hyperlipidemia, unspecified hyperlipidemia type    5. Essential hypertension      Return if symptoms worsen or fail to improve.  Physical therapy consult placed.  Patient is to continue with her follow-up neurology appointment in October as well as her endocrinology appointment to get a better control of her diabetes.  Should she fail maximal medical therapy with a repeat stroke, endovascular stent placement at the M1 stenosis will be considered.

## 2021-08-12 ENCOUNTER — OFFICE VISIT (OUTPATIENT)
Dept: NEUROSURGERY | Facility: CLINIC | Age: 41
End: 2021-08-12

## 2021-08-12 VITALS
SYSTOLIC BLOOD PRESSURE: 130 MMHG | DIASTOLIC BLOOD PRESSURE: 86 MMHG | TEMPERATURE: 97.8 F | HEIGHT: 64 IN | BODY MASS INDEX: 43.02 KG/M2 | RESPIRATION RATE: 16 BRPM | WEIGHT: 252 LBS | HEART RATE: 84 BPM

## 2021-08-12 DIAGNOSIS — E11.59 TYPE 2 DIABETES MELLITUS WITH OTHER CIRCULATORY COMPLICATION, WITHOUT LONG-TERM CURRENT USE OF INSULIN (HCC): ICD-10-CM

## 2021-08-12 DIAGNOSIS — Z86.73 HISTORY OF RECENT STROKE: ICD-10-CM

## 2021-08-12 DIAGNOSIS — E78.5 HYPERLIPIDEMIA, UNSPECIFIED HYPERLIPIDEMIA TYPE: ICD-10-CM

## 2021-08-12 DIAGNOSIS — I67.9 INTRACRANIAL VASCULAR STENOSIS: Primary | ICD-10-CM

## 2021-08-12 DIAGNOSIS — I10 ESSENTIAL HYPERTENSION: ICD-10-CM

## 2021-08-12 PROCEDURE — 99214 OFFICE O/P EST MOD 30 MIN: CPT | Performed by: RADIOLOGY

## 2021-08-12 RX ORDER — AMLODIPINE BESYLATE 5 MG/1
TABLET ORAL
COMMUNITY
Start: 2021-06-25 | End: 2021-08-12 | Stop reason: ALTCHOICE

## 2021-08-12 RX ORDER — ERGOCALCIFEROL 1.25 MG/1
CAPSULE ORAL
COMMUNITY
Start: 2021-07-01

## 2021-08-12 RX ORDER — ATORVASTATIN CALCIUM 40 MG/1
40 TABLET, FILM COATED ORAL DAILY
COMMUNITY
Start: 2021-06-11 | End: 2021-08-12 | Stop reason: ALTCHOICE

## 2021-11-10 ENCOUNTER — OFFICE VISIT (OUTPATIENT)
Dept: NEUROLOGY | Facility: CLINIC | Age: 41
End: 2021-11-10

## 2021-11-10 VITALS
BODY MASS INDEX: 43.36 KG/M2 | HEART RATE: 87 BPM | SYSTOLIC BLOOD PRESSURE: 116 MMHG | DIASTOLIC BLOOD PRESSURE: 82 MMHG | WEIGHT: 254 LBS | OXYGEN SATURATION: 97 % | HEIGHT: 64 IN

## 2021-11-10 DIAGNOSIS — Z86.73 HISTORY OF RECENT STROKE: Primary | ICD-10-CM

## 2021-11-10 DIAGNOSIS — I67.9 INTRACRANIAL VASCULAR STENOSIS: ICD-10-CM

## 2021-11-10 PROCEDURE — 99417 PROLNG OP E/M EACH 15 MIN: CPT | Performed by: PSYCHIATRY & NEUROLOGY

## 2021-11-10 PROCEDURE — 99215 OFFICE O/P EST HI 40 MIN: CPT | Performed by: PSYCHIATRY & NEUROLOGY

## 2021-11-10 RX ORDER — CLOPIDOGREL BISULFATE 75 MG/1
75 TABLET ORAL DAILY
Qty: 30 TABLET | Refills: 3 | Status: SHIPPED | OUTPATIENT
Start: 2021-11-10 | End: 2022-11-10

## 2021-11-10 RX ORDER — PRENATAL VIT NO.126/IRON/FOLIC 28MG-0.8MG
TABLET ORAL DAILY
COMMUNITY

## 2021-11-10 RX ORDER — NICOTINE POLACRILEX 2 MG
GUM BUCCAL
COMMUNITY

## 2021-11-10 RX ORDER — LIRAGLUTIDE 6 MG/ML
INJECTION SUBCUTANEOUS DAILY
COMMUNITY

## 2021-11-10 NOTE — PATIENT INSTRUCTIONS
Accidente cerebrovascular isquémico  Ischemic Stroke    Un accidente cerebrovascular isquémico es la muerte súbita del tejido cerebral. Danita tipo de accidente cerebrovascular ocurre cuando arcelia parte del cerebro no recibe suficiente radha. Juncos puede causar cambios de por demarcus en el funcionamiento del cerebro. Danita cambio puede producir problemas en otras partes del cuerpo.  Esta afección es arcelia emergencia. Se debe tratar a la persona de inmediato.  ¿Cuáles son las causas?  La causa de esta afección es arcelia disminución del flujo de radha a parte del cerebro. [place] puede ser consecuencia de lo siguiente:  · Arcelia pequeña aglomeración, o coágulo, de radha.  · Arcelia acumulación de arcelia sustancia grasa (placa) en los vasos sanguíneos.  · Ritmo cardíaco anormal.  · Arcelia arteria obstruida o dañada en la surya o el jorge. Las arterias son vasos sanguíneos que llevan la radha desde el corazón al imtiaz del cuerpo.  · Infección.  · Hinchazón de las arterias del cerebro.  ¿Qué incrementa el riesgo?  Cosas que se pueden modificar  · Otros problemas médicos, ladonna:  ? Presión arterial magnolia (hipertensión arterial).  ? Enfermedad cardíaca.  ? Diabetes.  ? Colesterol alto.  ? Tener mucho sobrepeso (obesidad).  ? Pausas o interrupción de la respiración manuel el sueño (apnea del sueño).  ? Cefalea migrañosa.  · Fumar u otra forma de consumo de tabaco.  · Ser sedentario.  · Consumo excesivo de alcohol.  · Consumir drogas.  · Usar anticonceptivos orales.  Cosas que no se pueden modificar  · Ser mayor de 60 años de edad.  · Tener antecedentes de coágulos de radha, accidente cerebrovascular o miniaccidente cerebrovascular (accidente isquémico transitorio, AIT).  · Presión arterial magnolia manuel el embarazo, si es familia.  · Un accidente cerebrovascular en cedeño jose.  · Anemia drepanocítica.  · Trastornos que afectan la coagulación de la radha.  ¿Cuáles son los signos o síntomas?  Los síntomas de un accidente cerebrovascular, por lo  habitual, ocurren de repente. Pueden incluir los siguientes:  · Debilidad o pérdida de sensibilidad en la jimena, el brazo o la pierna, con frecuencia de un lado del cuerpo.  · Pérdida del equilibrio.  · Pérdida del movimiento controlado y correcto de las partes del cuerpo (coordinación).  · Hablas arrastrando las palabras.  · Dificultad para hablar o para comprender lo que las personas dicen.  · Problemas para evelin las cosas correctamente.  · Sentirse mareado o confundido.  · Sensación de que va a vomitar (náuseas) y vomitar.  · Dolor de surya intenso sin razón aparente.  Si puede, anote la hora exacta a la que se sintió normal por última vez y la hora a que tuvo los primeros síntomas. Comuníquese con cedeño médico si: Si los síntomas aparecen y desaparecen, podrían ser causados por un miniaccidente cerebrovascular. [place]que ayuda de inmediato, incluso si se siente mejor.  ¿Cómo se trata?  Debe recibir tratamiento apenas tenga síntomas de accidente cerebrovascular. Algunos tratamientos resultan más eficaces si se realizan en el plazo de las 3 a 6 horas de la aparición de los primeros síntomas. Pueden darle medicamentos para lo siguiente:  · Extraer o destruir el coágulo de radha.  · Controlar la presión arterial.  · Anticoagular la radha.  Otros tratamientos pueden incluir los siguientes:  · Tratamiento para la respiración.  · Líquidos a través de un tubo (catéter) intravenoso.  · Procedimientos que hacen que la radha fluya mejor.  Es posible que deba controlar el riesgo de tener un accidente cerebrovascular con medicamentos y cambios en la dieta. Después de un accidente cerebrovascular, puede recibir terapia para estar mejor.  Siga estas instrucciones en cedeño casa:  Medicamentos  · Use los medicamentos de venta drea y los recetados solamente ladonna se lo haya indicado el médico.  · Si le indicaron que tome aspirinas u otros medicamentos para diluir la radha, tómelos exactamente ladonna se lo hayan indicado. Tómelos a la  misma hora todos los días.  ? El exceso de estos medicamentos puede provocar barby hemorragia.  ? Si no saeid la cantidad suficiente de medicamentos, es posible que no damion tan eficaces.  · Conozca los efectos secundarios de los medicamentos. Si saeid anticoagulantes, asegúrese de lo siguiente:  ? Ejerza presión sobre cualquier herida por más tiempo que lo habitual.  ? Informe a cedeño dentista y a otros médicos que saeid estos medicamentos.  ? Evite las actividades que podrían causarle lesiones o moretones.  Comida y bebida  · Siga las indicaciones del médico respecto de la dieta.  · Consuma alimentos saludables.  · Si tiene dificultad para tragar:  ? Coma de a porciones pequeñas.  ? Coma comidas blandas o en puré.  Seguridad  · Siga las instrucciones del equipo médico con respecto a la actividad física.  · Use un andador o un bastón según las indicaciones del médico.  · Patric de cedeño hogar un lugar seguro para evitar caídas. Puede que debas hacer lo siguiente:  ? Hacer que profesionales inspeccionen cedeño casa para asegurarse de que sea emery.  ? Coloque barras para sostén en la habitación y el baño.  ? Elevar el inodoro.  ? Coloque un asiento en la ducha.  Instrucciones generales  · No consuma ningún producto que contenga nicotina o tabaco, ladonna cigarrillos, cigarrillos electrónicos y tabaco de mascar. Si necesita ayuda para dejar de fumar, consulte al médico.  · Si michael alcohol:  ? Limite la cantidad que michael:  § De 0 a 1 medida por día para las mujeres.  § De 0 a 2 medidas por día para los hombres.  ? Esté atento a la cantidad de alcohol que hay en las bebidas que saeid. [place], barby medida equivale a barby botella de cerveza de 12 oz (355 ml), un vaso de vino de 5 oz (148 ml) o un vaso de barby bebida alcohólica de magnolia graduación de 1½ oz (44 ml).  · Si necesita ayuda para dejar de consumir drogas o alcohol, pídale al médico que le recomiende un programa o que lo derive a un especialista.  · Mantenerse activo.  Patric ejercicio según las indicaciones.  · Use un brazalete médico ladonna se lo indique cedeño médico.  · Concurra a todas las visitas de seguimiento ladonna se lo haya indicado el médico. Concurra a las visitas con todos los especialistas de cedeño equipo de atención. [place] es importante.  ¿Cómo se previene?  Puede reducir el riesgo de sufrir otro accidente cerebrovascular si usted:  · Controla la presión arterial magnolia, el colesterol alto, la diabetes, las enfermedades cardíacas, los problemas para dormir y el peso.  · [place] de fumar, limita el consumo de alcohol y se mantiene activo.  Trabaja con cedeño médico para cuidarse después de un accidente cerebrovascular. [place] puede evitar que tenga más problemas.  Solicite ayuda de inmediato si:  Tiene algún signo de accidente cerebrovascular. “BE FAST” es barby manera fácil de recordar las principales señales de advertencia:  · B - Balance (equilibrio). Los signos son mareos, dificultad repentina para caminar o pérdida del equilibrio.  · E - Eyes (ojos). Los signos son dificultad para evelin o un cambio en la visión.  · F - Face (theodore). Los signos son debilidad repentina o pérdida de sensación en el theodore, o el theodore o el párpado que se caen hacia un lado.  · A - Arms (brazos). Los signos son debilidad o pérdida de la sensibilidad en un brazo. [place] sucede de repente y generalmente en un lado del cuerpo.  · S - Speech (habla). Los signos son dificultad para hablar, hablar arrastrando las palabras o dificultad para comprender lo que la gente dice.  · T - Time (tiempo). Es tiempo de llamar al servicio de emergencias. Anote la hora a la que comenzaron los síntomas.  Presenta otros signos de un accidente cerebrovascular, ladonna los siguientes:  · Dolor de surya repentino y muy intenso sin causa aparente.  · Ganas de vomitar.  · Vómitos.  · Barby convulsión.  Estos síntomas pueden indicar barby emergencia. No espere a evelin si los síntomas desaparecen. Solicite atención médica de inmediato. Comuníquese  con el servicio de emergencias de cedeño localidad (911 en [place]). No conduzca por johnathon propios medios hasta el hospital.  Resumen  · Un accidente cerebrovascular isquémico es la muerte súbita del tejido cerebral.  · Los síntomas de un accidente cerebrovascular, con frecuencia, ocurren de repente.  · Debe recibir tratamiento apenas tenga síntomas de accidente cerebrovascular.  · El accidente cerebrovascular es barby emergencia. Se debe tratar a la persona de inmediato.  Esta información no tiene ladonna fin reemplazar el consejo del médico. Asegúrese de hacerle al médico cualquier pregunta que tenga.  Document Revised: 01/25/2021 Document Reviewed: 01/25/2021  Elsevier Patient Education © 2021 [employer]    Accidente cerebrovascular isquémico  Ischemic Stroke    Un accidente cerebrovascular isquémico (ACV) es la muerte repentina de tejido cerebral que ocurre cuando barby richie del cerebro no recibe el flujo sanguíneo suficiente. Esta afección es barby emergencia médica que debe tratarse de inmediato. Un accidente cerebrovascular isquémico puede causar barby pérdida permanente de la actividad cerebral. La pérdida de la actividad cerebral puede causar problemas con el funcionamiento de diferentes partes del cuerpo.  ¿Cuáles son las causas?  Esta afección es causada por la disminución del flujo sanguíneo a barby richie del cerebro, que puede ser el resultado de lo siguiente:  · Un pequeño coágulo de radha (émbolo) o barby acumulación de placas en los vasos sanguíneos, llamada aterosclerosis, que obstruyan el flujo sanguíneo en el cerebro.  · Un ritmo cardíaco anormal llamado fibrilación auricular, que envía un pequeño coágulo de radha al cerebro.  · Barby arteria obstruida o dañada en la surya o el jorge.  · Ciertas infecciones.  · Inflamación de las arterias del cerebro (vasculitis).  En ocasiones, se desconoce la causa del accidente cerebrovascular isquémico.  ¿Qué incrementa el riesgo?  Los siguientes factores pueden  hacer que sea más propenso a desarrollar esta afección:  Factores que pueden ser modificados  · Presión arterial magnolia (hipertensión) u otras afecciones, ladonna:  ? Enfermedad cardíaca.  ? Diabetes mellitus.  ? Colesterol alto.  ? Obesidad.  ? Apnea del sueño.  ? Cefalea migrañosa.  · Fumar cigarrillos o consumir otros productos que contengan tabaco.  · Falta de actividad física.  · Consumo excesivo de alcohol.  · Consumo de drogas ilegales, especialmente cocaína y metanfetamina.  · Lou píldoras anticonceptivas, en especial si también consume tabaco.  Factores que no pueden ser modificados  · Ser mayor de 60 años de edad.  · Antecedentes de coágulos sanguíneos, accidente cerebrovascular o miniaccidente cerebrovascular (accidente isquémico transitorio, AIT).  · Antecedentes de presión arterial magnolia en el embarazo (preeclampsia), si es familia.  · Antecedentes familiares de accidente cerebrovascular.  · Anemia drepanocítica.  · Trastornos de coagulación (estado hipercoagulable).  ¿Cuáles son los signos o síntomas?  Los síntomas de esta afección, por lo general, aparecen de forma repentina, o puede notarlos después de despertarse. Estos síntomas repentinos pueden incluir los siguientes:  · Debilidad o adormecimiento de la jimena, del brazo o de la pierna, especialmente en un lado del cuerpo.  · Pérdida del equilibrio o de la coordinación.  · Hablar arrastrando las palabras o afasia. La afasia es la dificultad para hablar o para comprender el lenguaje, o ambas cosas.  · Cambios en la visión en alejandra o ambos ojos. Puede tener visión doble, visión borrosa o pérdida de la visión.  · Mareos o confusión.  · Náuseas y vómitos.  · Dolor de surya intenso sin causa aparente.  En lo posible, anote la hora exacta a la que se sintió normal por última vez y de la hora en que comenzaron los síntomas. Infórmele a cedeño médico.  Si los síntomas aparecen y desaparecen, podrían ser signos de un AIT (accidente isquémico transitorio). Busque ayuda  de inmediato, incluso si se siente mejor.  ¿Cómo se diagnostica?  Esta afección se puede diagnosticar en función de lo siguiente:  · Los síntomas, los antecedentes médicos y un examen físico.  · Exploración por tomografía computarizada (TC) del cerebro.  · Resonancia magnética (RM).  · Estudios de diagnóstico por imágenes para examinar el flujo sanguíneo (circulación) en el cerebro. Estos pueden ser angiograma por tomografía computarizada (TC), angiograma por resonancia magnética (RM) o angiograma cerebral.  [place] vez deba consultar a un médico especialista en accidente cerebrovascular. Puede consultar a un especialista en accidentes cerebrovasculares persona, por teléfono o mediante tecnología a distancia (telemedicina).  También pueden hacerle otras pruebas, incluidas las siguientes:  · Electrocardiograma (ECG).  · Monitorización electrocardiográfica continua.  · Ecocardiograma transtorácico (ETT).  · Ecocardiograma transesofágico (ETE).  · Ecografía de la carótida.  · Análisis de radha.  · Estudio del sueño para verificar si tiene apnea del sueño.  ¿Cómo se trata?  El tratamiento de esta afección depende de la duración, la gravedad y la causa de los síntomas, y de la richie del cerebro afectada. Es muy importante recibir tratamiento tras aparecer los primeros síntomas del accidente cerebrovascular. Algunos tratamientos resultan más eficaces si se realizan en el plazo de las 3 a 6 horas del inicio de los síntomas del accidente cerebrovascular. Estos tratamientos pueden incluir los siguientes:  · Un medicamento trombolítico que se inyecta para disolver el coágulo de radha.  · Tratamientos aplicados directamente en la arteria afectada para eliminar o disolver el coágulo de radha.  · Medicamentos para controlar la presión arterial.  · Medicamentos antiplaquetarios o anticoagulantes para diluir la radha.  Otros tratamientos pueden incluir los siguientes:  · Oxígeno.  · Líquidos intravenosos (i.v.)  · Procedimientos  para aumentar el flujo sanguíneo.  La administración de medicamentos y los cambios en la dieta pueden ayudar a controlar los factores de riesgo de accidente cerebrovascular, ladonna la diabetes, el colesterol alto y la hipertensión arterial.  Después de un accidente cerebrovascular, puede trabajar con fisioterapeutas, fonoaudiólogos o terapeutas ocupacionales o de erika mental para ayudarlo a recuperarse.  Siga estas instrucciones en cedeño casa:  Medicamentos  · Use los medicamentos de venta drea y los recetados solamente ladonna se lo haya indicado el médico.  · Si le indicaron que tome un medicamento para diluir la radha, tómelo exactamente ladonna se lo hayan indicado, a la misma hora todos los días. [place] incluye la aspirina o un anticoagulante.  ? El exceso de anticoagulantes puede causar hemorragias.  ? Si no saeid la cantidad suficiente, no contará con la protección que necesita contra otro accidente cerebrovascular y demás problemas.  · Conozca los efectos secundarios de yunior anticoagulantes. Al yunior jerry tipo de medicamentos, asegúrese de lo siguiente:  ? Ejercer presión sobre las heridas por más tiempo que lo habitual.  ? Informe a cedeño dentista y otros médicos que saeid anticoagulantes antes de que le realicen alguna intervención quirúrgica que pueda causar hemorragias.  ? Evite las actividades que podrían causarle lesiones o moretones.  ? Use un brazalete de alerta médica o lleve barby tarjeta con barby lista de los medicamentos que saeid.  Comida y bebida  · Siga las instrucciones del médico acerca de la dieta.  · Consuma alimentos saludables.  · Si el accidente cerebrovascular afectó cedeño capacidad para tragar, es posible que necesite yunior medidas para no ahogarse. Estas pueden incluir yunior bocados pequeños al comer, y comer alimentos blandos o hechos puré.  Seguridad  · Siga las instrucciones del equipo médico con respecto a la actividad física.  · Use un andador o un bastón ladonna se lo haya indicado el médico.  · [place]  medidas para crear un entorno seguro en cedeño casa para reducir el riesgo de caídas. Estas medidas pueden incluir las siguientes:  ? Hacer que profesionales inspeccionen cedeño casa.  ? Colocar barras para sostén en la habitación y el baño.  ? Colocar inodoros elevados y un asiento en la ducha ladonna medidas de seguridad.  Instrucciones generales  · No consuma ningún producto que contenga nicotina o tabaco, ladonna cigarrillos, cigarrillos electrónicos y tabaco de mascar. Si necesita ayuda para dejar de fumar, consulte al médico.  · Si michael alcohol:  ? Limite la cantidad que michael:  § De 0 a 1 medida por día para las mujeres.  § De 0 a 2 medidas por día para los hombres.  ? Esté atento a la cantidad de alcohol que hay en las bebidas que saeid. [place], barby medida equivale a barby botella de cerveza de 12 oz (355 ml), un vaso de vino de 5 oz (148 ml) o un vaso de barby bebida alcohólica de magnolia graduación de 1½ oz (44 ml).  · Si necesita ayuda para dejar de consumir drogas o alcohol, pídale al médico que le recomiende un programa o que lo derive a un especialista.  · Mantenga un estilo de demarcus activo y saludable. Patric ejercicio regularmente ashish ladonna se lo hayan indicado.  · Use un brazalete médico según las indicaciones de cedeño médico.  · Acuda a todas las consultas de control ladonna se lo haya indicado el médico, incluidas las consultas con todos los especialistas de cedeño equipo médico. [place] es importante.  ¿Cómo se previene?  Puede reducir el riesgo de sufrir otro accidente cerebrovascular al tratar, de manera adecuada, la hipertensión arterial, el colesterol alto, la diabetes, las enfermedades cardíacas, la apnea del sueño y la obesidad. El riesgo también puede reducirse si magno de fumar, limita el consumo de alcohol y se mantiene físicamente activo.  El médico continuará ayudándolo con maneras de evitar las complicaciones a corto y a ag plazo causadas por el accidente cerebrovascular.  Solicite ayuda de inmediato  si:  Tiene síntomas de un accidente cerebrovascular. “BE FAST” es barby manera fácil de recordar los principales signos de advertencia de un accidente cerebrovascular:  · B - Balance (equilibrio). Los signos son mareos, dificultad repentina para caminar o pérdida del equilibrio.  · E - Eyes (ojos). Los signos son problemas para evelin o un cambio repentino en la visión.  · F - Face (theodore). Los signos son debilidad repentina o adormecimiento del theodore, o el theodore o el párpado que se caen hacia un lado.  · A - Arms (brazos). Los signos son debilidad o adormecimiento en un brazo. [place] sucede de repente y generalmente en un lado del cuerpo.  · S - Speech (habla). Los signos son dificultad para hablar, hablar arrastrando las palabras o dificultad para comprender lo que la gente dice.  · T - Time (tiempo). Es tiempo de llamar al servicio de emergencias. Anote la hora a la que comenzaron los síntomas.  Presenta otros signos de un accidente cerebrovascular, ladonna los siguientes:  · Dolor de surya súbito e intenso que no tiene causa aparente.  · Náuseas o vómitos.  · Convulsiones.  Estos síntomas pueden representar un problema grave que constituye barby emergencia. No espere a evelin si los síntomas desaparecen. Solicite atención médica de inmediato. Comuníquese con el servicio de emergencias de cedeño localidad (911 en los [place]). No conduzca por johnathon propios medios hasta el hospital.  Resumen  · Un accidente cerebrovascular isquémico (ACV) es la muerte repentina de tejido cerebral que ocurre cuando barby richie del cerebro no recibe el flujo sanguíneo suficiente.  · Los síntomas de esta afección, por lo general, aparecen de forma repentina, o puede notarlos después de despertarse.  · Es muy importante recibir tratamiento tras aparecer los primeros síntomas del accidente cerebrovascular. El accidente cerebrovascular es barby emergencia médica que debe tratarse de inmediato.  Esta información no tiene ladonna fin reemplazar el consejo  del médico. Asegúrese de hacerle al médico cualquier pregunta que tenga.  Document Revised: 01/25/2021 Document Reviewed: 01/25/2021  Elsevier Patient Education © 2021 [employer]

## 2021-11-10 NOTE — ASSESSMENT & PLAN NOTE
As mentioned above will continue optimized medical treatment with dual antiplatelet therapy along with high dose statin at this time.  Has been evaluated for possible intervention.

## 2021-11-10 NOTE — ASSESSMENT & PLAN NOTE
41 year old right handed woman with multifocal right hemispheric MCA strokes as mentioned above.  She has had full stroke work up done as stated above.   Patient does appear to have occlusion or near occlusive stenosis of the right M1 proximally, although there is reconstitution at its midportion. Overall, there is diminished enhancement and caliber of the right middle cerebral artery vessels when compared to the left. Occlusion of the right M1, although there is reconstitution of the right M2 by the anterior communicating artery.  Echocardiogram with 59% EF and looked good.  It was decided that she be on optimum maximized medical therapy including aspirin 81 mg along with clopidogrel and atorvastatin prior to considering any type of vascular intervention.  She denies any new stroke symptoms since being discharged from the hospital.  No blood in her stool or urine.  She ran out of the clopidogrel so has not been taking this medicine as it was not refilled.  She does not smoke.  Her BP is well controlled today.  She denies any family history of strokes.  She continues to have some weakness with her left hand .  She denies getting occupational therapy for the left hand weakness.  Discussed stroke symptoms at length with patient and provided patient education information today.  I will restart her on the clopidogrel to be taken with the aspirin and high dose statin for secondary stroke prevention.  I will also refer her to occupational therapy for further evaluation and treatment of her left hand weakness. Her HgbA1c was 8.1% and I advised her to follow up with her PCP for further treatment/management.

## 2021-11-10 NOTE — PROGRESS NOTES
Chief Complaint  Stroke    Subjective          Reena García presents to [facility] NEUROLOGY for   HISTORY OF PRESENT ILLNESS:    Reena García is a 41 year old right handed woman who presents to neurology clinic for initial evaluation and follow up of hospitalization for strokes.  She presents with  who is interpreting for her today and assisting with the interview.  She reports initially in June 2021 she started noticing symptoms in her left hand including numbness and difficulty using the TV remote with her left hand.  Then on July 14th she started feeling like her left hand was stuck and was having trouble opening the lock with a key.  She went to the ED on 7/27/2021 and her initial CT imaging of the brain without contrast performed at [facility] revealed an area of decreased attenuation in the right basal ganglia and an adjacent lesion in the superior R frontal lobe. These findings were concerning for brain neoplasm.  But then she had the brain MRI scan done which demonstrates strokes.  I independently reviewed these images on her visit today and demonstrated the abnormal findings to patient on brain MRI scan during visit.      Brain MRI: There is a 3 x 2.2 x 2.5 cm early subacute infarct involving the head and body of the right caudate nucleus extending through the anterior limb and genu of the right internal capsule and throughout the majority of the right putamen that has some methemoglobin deposition from some subacute petechial hemorrhage into it. Furthermore, there are multiple small nodular crescentic and patchy areas of acute to early subacute infarction involving the posterior superior right frontal cortex and a thin band in the anterior superior right parietal cortex as well as involving the superior right frontal parietal subcortical white matter and a small 5 mm nodular focus in the anterior lateral right frontal juxtacortical white  matter. Many of these demonstrate some faint enhancement as does the right basal ganglion subacute infarct and these are enhancing early subacute infarcts in the right middle cerebral artery territory. Furthermore there is a tiny focus of signal abnormality at the right internal carotid terminus extending into the origin of the M1 segment of the right middle cerebral artery. This could potentially be  thrombus. I recommend a CT angiogram of the head and neck, CT perfusion study of the head for a more comprehensive assessment. Furthermore, in a 40-year-old patient, the most common etiologies for embolic events to the right MCA territory include carotid dissection and a patent foramen ovale. Further evaluation of the heart with an echocardiogram is recommended.    She had a CTA of her head and neck done which was read as: 1. Previously identified areas of infarction within the right middle cerebral hemisphere are stable. 2. No cervical vascular stenosis or occlusion. 3. Patient does appear to have occlusion or near occlusive stenosis of the right M1 proximally, although there is reconstitution at its midportion. Overall, there is diminished enhancement and caliber of the right middle cerebral artery vessels when compared to the left. 4. Occlusion of the right M1, although there is reconstitution of the right M2 by the anterior communicating artery.    Echocardiogram with 59% EF and looked good.  It was decided that she be on optimum maximized medical therapy including aspirin 81 mg along with clopidogrel and atorvastatin prior to considering any type of vascular intervention.  She denies any new stroke symptoms since being discharged from the hospital.  No blood in her stool or urine.  She ran out of the clopidogrel so has not been taking this medicine as it was not refilled.  She does not smoke.  Her BP is well controlled today.  She denies any family history of strokes.  She continues to have some weakness with her  "left hand .  She denies getting occupational therapy for the left hand weakness.  Her HgbA1c was 8.1% and I advised her to follow up with her PCP.      Past Medical History:   Diagnosis Date   • Diabetes mellitus (HCC)    • Hyperlipidemia    • Hypertension         History reviewed. No pertinent family history.     Social History     Socioeconomic History   • Marital status:    Tobacco Use   • Smoking status: Never Smoker   • Smokeless tobacco: Never Used   Vaping Use   • Vaping Use: Never used   Substance and Sexual Activity   • Alcohol use: Never   • Drug use: Never   • Sexual activity: Defer     Birth control/protection: Implant        I have personally reviewed the ROS as stated below.     Review of Systems   Constitutional: Positive for appetite change and fatigue. Negative for activity change.   HENT: Negative for trouble swallowing and voice change.    Eyes: Negative for blurred vision, double vision and pain.   Respiratory: Negative for stridor.    Gastrointestinal: Negative for nausea and vomiting.   Genitourinary: Negative for vaginal discharge and vaginal pain.   Musculoskeletal: Positive for back pain.   Neurological: Negative for dizziness, tremors, seizures, syncope, facial asymmetry, speech difficulty, weakness, light-headedness, numbness, headache, memory problem and confusion.   Psychiatric/Behavioral: Negative for agitation, behavioral problems, decreased concentration, dysphoric mood, hallucinations, self-injury, sleep disturbance, suicidal ideas, negative for hyperactivity, depressed mood and stress. The patient is not nervous/anxious.         Objective   Vital Signs:   /82 (BP Location: Right arm, Patient Position: Sitting)   Pulse 87   Ht 162.6 cm (64.02\")   Wt 115 kg (254 lb)   SpO2 97%   BMI 43.58 kg/m²       PHYSICAL EXAM:    General   Mental Status - Alert. General Appearance - Well developed, Well groomed, Oriented and Cooperative. Orientation - Oriented X3.       Head " and Neck  Head - normocephalic, atraumatic with no lesions or palpable masses.  Neck    Global Assessment - supple.       Eye   Sclera/Conjunctiva - Bilateral - Normal.    ENMT  Mouth and Throat   Oral Cavity/Oropharynx: Oropharynx - the soft palate,uvula and tongue are normal in appearance.    Chest and Lung Exam   Chest - lung clear to auscultation bilaterally.    Cardiovascular   Cardiovascular examination reveals  - normal heart sounds, regular rate and rhythm.    Neurologic   Mental Status: Speech - Normal. Cognitive function - appropriate fund of knowledge. No impairment of attention, Impairment of concentration, impairment of long term memory or impairment of short term memory.  Cranial Nerves:   II Optic: Visual acuity - Left - Normal. Right - Normal. Visual fields - Normal (to confrontation).  III Oculomotor: Pupillary constriction - Left - Normal. Right - Normal.  VII Facial: - Very mild left facial drooping on activation.   VIII Acoustic - Bilateral - Hearing normal and (Hearing tested by finger rub).   IX Glossopharyngeal / X Vagus - Normal.  XI Accessory: Trapezius - Bilateral - Normal. Sternocleidomastoid - Bilateral - Normal.  XII Hypoglossal - Bilateral - Normal.  Eye Movements: - Normal Bilaterally.  Sensory:   Light Touch: Intact - Globally.  Motor:   Bulk and Contour: - Normal.  Tone: - Normal.  Tremor: Not present.  Strength: 5/5 normal muscle strength - other than left hand with mild weakness 4+/5.     General Assessment of Reflexes: - deep tendon reflexes are more brisk on the left side. Coordination - No Impairment of finger-to-nose or Impairment of rapid alternating movements. Gait - Normal.       Result Review :                 Assessment and Plan    Problem List Items Addressed This Visit        Neuro    History of recent stroke - Primary    Current Assessment & Plan     41 year old right handed woman with multifocal right hemispheric MCA strokes as mentioned above.  She has had full stroke  work up done as stated above.   Patient does appear to have occlusion or near occlusive stenosis of the right M1 proximally, although there is reconstitution at its midportion. Overall, there is diminished enhancement and caliber of the right middle cerebral artery vessels when compared to the left. Occlusion of the right M1, although there is reconstitution of the right M2 by the anterior communicating artery.  Echocardiogram with 59% EF and looked good.  It was decided that she be on optimum maximized medical therapy including aspirin 81 mg along with clopidogrel and atorvastatin prior to considering any type of vascular intervention.  She denies any new stroke symptoms since being discharged from the hospital.  No blood in her stool or urine.  She ran out of the clopidogrel so has not been taking this medicine as it was not refilled.  She does not smoke.  Her BP is well controlled today.  She denies any family history of strokes.  She continues to have some weakness with her left hand .  She denies getting occupational therapy for the left hand weakness.  Discussed stroke symptoms at length with patient and provided patient education information today.  I will restart her on the clopidogrel to be taken with the aspirin and high dose statin for secondary stroke prevention.  I will also refer her to occupational therapy for further evaluation and treatment of her left hand weakness. Her HgbA1c was 8.1% and I advised her to follow up with her PCP for further treatment/management.           Relevant Medications    clopidogrel (Plavix) 75 MG tablet    Other Relevant Orders    Ambulatory Referral to Occupational Therapy    Intracranial vascular stenosis    Current Assessment & Plan     As mentioned above will continue optimized medical treatment with dual antiplatelet therapy along with high dose statin at this time.  Has been evaluated for possible intervention.           Relevant Medications    clopidogrel  (Plavix) 75 MG tablet          I spent 75 minutes caring for Reena on this date of service. This time includes time spent by me in the following activities:preparing for the visit, reviewing tests, obtaining and/or reviewing a separately obtained history, performing a medically appropriate examination and/or evaluation , counseling and educating the patient/family/caregiver, ordering medications, tests, or procedures, documenting information in the medical record, independently interpreting results and communicating that information with the patient/family/caregiver and care coordination    Follow Up   Return in about 3 months (around 2/10/2022).  Patient was given instructions and counseling regarding her condition or for health maintenance advice. Please see specific information pulled into the AVS if appropriate.

## 2022-01-06 ENCOUNTER — HOSPITAL ENCOUNTER (EMERGENCY)
Facility: HOSPITAL | Age: 42
Discharge: HOME | End: 2022-01-07
Attending: EMERGENCY MEDICINE | Admitting: EMERGENCY MEDICINE

## 2022-01-06 DIAGNOSIS — R04.0 RIGHT-SIDED EPISTAXIS: Primary | ICD-10-CM

## 2022-01-06 PROCEDURE — 99283 EMERGENCY DEPT VISIT LOW MDM: CPT

## 2022-01-06 RX ORDER — LABETALOL HYDROCHLORIDE 5 MG/ML
20 INJECTION, SOLUTION INTRAVENOUS ONCE
Status: DISCONTINUED | OUTPATIENT
Start: 2022-01-06 | End: 2022-01-06

## 2022-01-06 RX ORDER — LIDOCAINE HYDROCHLORIDE AND EPINEPHRINE 10; 10 MG/ML; UG/ML
10 INJECTION, SOLUTION INFILTRATION; PERINEURAL ONCE
Status: COMPLETED | OUTPATIENT
Start: 2022-01-06 | End: 2022-01-06

## 2022-01-06 RX ADMIN — PHENYLEPHRINE HYDROCHLORIDE 2 SPRAY: 0.5 SPRAY NASAL at 23:34

## 2022-01-06 RX ADMIN — LIDOCAINE HYDROCHLORIDE,EPINEPHRINE BITARTRATE 10 ML: 10; .01 INJECTION, SOLUTION INFILTRATION; PERINEURAL at 23:34

## 2022-01-06 RX ADMIN — SILVER NITRATE APPLICATORS 1 APPLICATION: 25; 75 STICK TOPICAL at 23:34

## 2022-01-07 VITALS
HEART RATE: 83 BPM | OXYGEN SATURATION: 98 % | DIASTOLIC BLOOD PRESSURE: 100 MMHG | SYSTOLIC BLOOD PRESSURE: 159 MMHG | RESPIRATION RATE: 18 BRPM | TEMPERATURE: 97.6 F

## 2022-01-14 ENCOUNTER — HOSPITAL ENCOUNTER (EMERGENCY)
Facility: HOSPITAL | Age: 42
Discharge: HOME | End: 2022-01-14
Attending: EMERGENCY MEDICINE | Admitting: EMERGENCY MEDICINE

## 2022-01-14 VITALS
DIASTOLIC BLOOD PRESSURE: 89 MMHG | RESPIRATION RATE: 12 BRPM | SYSTOLIC BLOOD PRESSURE: 144 MMHG | OXYGEN SATURATION: 97 % | HEART RATE: 120 BPM | TEMPERATURE: 97.4 F

## 2022-01-14 DIAGNOSIS — M75.21 BICIPITAL TENDINITIS, RIGHT SHOULDER: Primary | ICD-10-CM

## 2022-01-14 PROCEDURE — 99283 EMERGENCY DEPT VISIT LOW MDM: CPT

## 2022-01-14 RX ORDER — METHOCARBAMOL 750 MG/1
750 TABLET, FILM COATED ORAL 3 TIMES DAILY
Qty: 21 TABLET | Refills: 0 | Status: SHIPPED | OUTPATIENT
Start: 2022-01-14

## 2022-01-14 RX ORDER — ACETAMINOPHEN 500 MG
1000 TABLET ORAL ONCE
Status: COMPLETED | OUTPATIENT
Start: 2022-01-14 | End: 2022-01-14

## 2022-01-14 RX ORDER — CYCLOBENZAPRINE HCL 10 MG
10 TABLET ORAL ONCE
Status: COMPLETED | OUTPATIENT
Start: 2022-01-14 | End: 2022-01-14

## 2022-01-14 RX ORDER — IBUPROFEN 800 MG/1
800 TABLET ORAL ONCE
Status: DISCONTINUED | OUTPATIENT
Start: 2022-01-14 | End: 2022-01-14

## 2022-01-14 RX ADMIN — ACETAMINOPHEN 1000 MG: 500 TABLET ORAL at 22:11

## 2022-01-14 RX ADMIN — CYCLOBENZAPRINE HYDROCHLORIDE 10 MG: 10 TABLET, FILM COATED ORAL at 22:11

## 2022-01-15 NOTE — ED NOTES
Patient was wearing a face mask throughout our encounter.  This RN wore appropriate PPE throughout the encounter.  Hand hygiene was performed before and after patient encounter.        Denisse Leonardo RN  01/14/22 2041

## 2022-01-15 NOTE — DISCHARGE INSTRUCTIONS
Return to the ER with any further concerns, should your condition change/worsen, or should you develop a fever.     Recommend wearing the sling for the next 3 to 5 days as needed to rest the shoulder and alleviate pain.  I would not wear the sling beyond this period of time without clearance to do so from a specialist/orthopedist.

## 2022-01-15 NOTE — ED PROVIDER NOTES
EMERGENCY DEPARTMENT ENCOUNTER    Room Number:  B03/03  Date of encounter:  1/26/2022  PCP: Bell Contreras APRN  Historian: Patient      HPI:  Chief Complaint: Right shoulder pain  A complete HPI/ROS/PMH/PSH/SH/FH are unobtainable due to: Nothing    Context: Reena García is a 41 y.o. female who presents to the ED c/o right shoulder pain for the past 2 days.  Patient states she woke up with pain in the anterior shoulder.  It is worse when she extends, abducts, internally or externally rotates the right upper extremity.  Holding the right upper extremity still alleviates some of the pain.  The pain does not radiate.  She denies injury.  She is here for further evaluation.      PAST MEDICAL HISTORY  Active Ambulatory Problems     Diagnosis Date Noted   • Type 2 diabetes mellitus (HCC) 07/27/2021   • Essential hypertension 07/27/2021   • Hyperlipemia 07/27/2021   • Abnormal liver CT 07/28/2021   • History of recent stroke 08/12/2021   • Intracranial vascular stenosis 08/12/2021     Resolved Ambulatory Problems     Diagnosis Date Noted   • Acute CVA (cerebrovascular accident) (HCC) 07/27/2021     Past Medical History:   Diagnosis Date   • Diabetes mellitus (HCC)    • Hyperlipidemia    • Hypertension          PAST SURGICAL HISTORY  No past surgical history on file.      FAMILY HISTORY  No family history on file.      SOCIAL HISTORY  Social History     Socioeconomic History   • Marital status:    Tobacco Use   • Smoking status: Never Smoker   • Smokeless tobacco: Never Used   Vaping Use   • Vaping Use: Never used   Substance and Sexual Activity   • Alcohol use: Never   • Drug use: Never   • Sexual activity: Defer     Birth control/protection: Implant         ALLERGIES  Ibuprofen        REVIEW OF SYSTEMS  Review of Systems   Constitutional: Negative for chills and fever.   HENT: Negative.    Respiratory: Negative for cough and shortness of breath.    Cardiovascular: Negative for chest pain, palpitations  and leg swelling.   Gastrointestinal: Negative for abdominal pain, nausea and vomiting.   Genitourinary: Negative.    Musculoskeletal:        Right shoulder pain   Skin: Negative for rash.   Neurological: Negative.    Psychiatric/Behavioral: Negative.         All systems reviewed and negative except for those discussed in HPI.       PHYSICAL EXAM    I have reviewed the triage vital signs and nursing notes.    ED Triage Vitals   Temp Heart Rate Resp BP SpO2   01/14/22 1919 01/14/22 1919 01/14/22 1919 01/14/22 1922 01/14/22 1919   97.4 °F (36.3 °C) 120 12 144/89 97 %      Temp src Heart Rate Source Patient Position BP Location FiO2 (%)   01/14/22 1919 01/14/22 1919 01/14/22 1922 01/14/22 1922 --   Tympanic Monitor Standing Left arm        Physical Exam  GENERAL: WDWN female no acute distress  HENT: nares patent  EYES: no scleral icterus  CV: regular rhythm, regular rate, brachial and radial pulses +2 bilaterally  RESPIRATORY: normal effort  ABDOMEN: soft  MUSCULOSKELETAL: TTP in the right bicipital groove.  Positive empty can sign.  Strength 5 of 5 bilateral upper extremities, compartments soft.  NEURO: alert, moves all extremities, follows commands, sensation intact C5-C6-C7, radial, median, ulnar motor function intact.   SKIN: warm, dry        LAB RESULTS  No results found for this or any previous visit (from the past 24 hour(s)).    Ordered the above labs and independently reviewed the results.        RADIOLOGY  No Radiology Exams Resulted Within Past 24 Hours    I ordered the above noted radiological studies. Reviewed by me and discussed with radiologist.  See dictation for official radiology interpretation.      PROCEDURES    Procedures      MEDICATIONS GIVEN IN ER    Medications   cyclobenzaprine (FLEXERIL) tablet 10 mg (10 mg Oral Given 1/14/22 2211)   acetaminophen (TYLENOL) tablet 1,000 mg (1,000 mg Oral Given 1/14/22 2211)         PROGRESS, DATA ANALYSIS, CONSULTS, AND MEDICAL DECISION MAKING    All labs  have been independently reviewed by me.  All radiology studies have been reviewed by me and discussed with radiologist dictating the report.   EKG's independently viewed and interpreted by me.  Discussion below represents my analysis of pertinent findings related to patient's condition, differential diagnosis, treatment plan and final disposition.    DDx includes but is not limited to: Bicipital tendinitis, rotator cuff injury, right shoulder impingement syndrome.  History and physical exam most consistent with bicipital tendinitis.  Plan to treat with a sling for a short period of no more than 3 to 5 days.  We will also treat with diclofenac gel and a muscle relaxer for the trapezius muscle spasm.    ED Course as of 01/26/22 0427   Fri Jan 14, 2022 2109 Working diagnosis is right shoulder bicipital tendinitis.  Will treat as mentioned previously. [RC]      ED Course User Index  [RC] Kade Mauro III, PA         PPE: The patient wore a surgical mask throughout the entire patient encounter. I wore an N95.    AS OF 04:27 EST VITALS:    BP - 144/89  HR - 120  TEMP - 97.4 °F (36.3 °C) (Tympanic)  O2 SATS - 97%        DIAGNOSIS  Final diagnoses:   Bicipital tendinitis, right shoulder         DISPOSITION  DISCHARGE    Patient discharged in stable condition.    Reviewed implications of results, diagnosis, meds, responsibility to follow up, warning signs and symptoms of possible worsening, potential complications and reasons to return to ER.    Patient/Family voiced understanding of above instructions.    Discussed plan for discharge, as there is no emergent indication for admission. Patient referred to primary care provider for BP management due to today's BP. Pt/family is agreeable and understands need for follow up and repeat testing.  Pt is aware that discharge does not mean that nothing is wrong but it indicates no emergency is present that requires admission and they must continue care with follow-up as  given below or physician of their choice.     FOLLOW-UP  BenBell jiménez, APRN  [address] [facility] [id number] [phone]    Schedule an appointment as soon as possible for a visit   For further evaluation and interim management    Vishal Castellon MD  [address] [facility] [address]  [facility] [id number] [phone]    Schedule an appointment as soon as possible for a visit   For further evaluation and treatment if your symptoms have not resolved in a week's time         Medication List      New Prescriptions    Diclofenac Sodium 1 % gel gel  Commonly known as: VOLTAREN  Apply 4 g topically to the appropriate area as directed 4 (Four) Times a Day As Needed (For shoulder pain).     methocarbamol 750 MG tablet  Commonly known as: ROBAXIN  Take 1 tablet by mouth 3 (Three) Times a Day.           Where to Get Your Medications      You can get these medications from any pharmacy    Bring a paper prescription for each of these medications  · Diclofenac Sodium 1 % gel gel  · methocarbamol 750 MG tablet                Kade Mauro III, PA  01/14/22 [id number]       Kade Mauro III, PA  01/26/22 4303

## 2022-01-15 NOTE — ED PROVIDER NOTES
The JACOB and I have discussed this patients history, physical exam, and treatment plan. I have reviewed the documentation and personally had a face to face interaction with the patient. I affirm the documentation and agree with the treatment and plan.  The following note describes my personal findings    I provided a substantive portion of the care of this patient.  I personally performed the physical exam in its entirety for this encounter.      This patient is a 41-year-old female presenting to the emergency room  secondary to a 2-day history of right shoulder discomfort.  The patient states that she woke up that way at onset.  She states that it does get worse when she extends or abducts her right upper extremity.  She denies any known trauma.    Physical Exam  Vitals and nursing note reviewed.   Constitutional:       General: She is not in acute distress.     Appearance: She is well-developed.   HENT:      Head: Normocephalic.   Eyes:      Pupils: Pupils are equal, round, and reactive to light.   Cardiovascular:      Rate and Rhythm: Normal rate and regular rhythm.   Pulmonary:      Effort: Pulmonary effort is normal. No respiratory distress.      Breath sounds: Normal breath sounds.   Abdominal:      General: Bowel sounds are normal.      Palpations: Abdomen is soft.      Tenderness: There is no abdominal tenderness. There is no guarding or rebound.   Musculoskeletal:         General: Tenderness present. No swelling, deformity or signs of injury. Normal range of motion.      Cervical back: Normal range of motion and neck supple.      Comments: Tenderness to palpation to the bicep insertion of the right shoulder without defect or deformity.  Full range of motion but with discomfort.   Skin:     General: Skin is warm and dry.      Findings: No rash.   Neurological:      Mental Status: She is alert and oriented to person, place, and time.       Plan: We will place the patient in a sling and prescribe her muscle  relaxers and instruct her to use anti-inflammatories as directed.      The patient was wearing a facemask upon entrance into the room and remained in such throughout their visit.  I was wearing PPE including a facemask, eye protection, as well as gloves at any point entering the room and throughout the visit     Vlad Mon MD  01/14/22 2727

## 2022-02-03 NOTE — ED PROVIDER NOTES
EMERGENCY DEPARTMENT ENCOUNTER    Room Number:  13/13  Date of encounter:  2/3/2022  PCP: Bell Contreras APRN  Historian: Patient      HPI:  Chief Complaint: Right-sided epistaxis  A complete HPI/ROS/PMH/PSH/SH/FH are unobtainable due to: Nothing    Context: Reena García is a 41 y.o. female who presents to the ED c/o right-sided epistaxis that began just prior to arrival.  Patient states she has a past medical history of CVA and is currently taking Plavix.  Patient states she immediately applied pressure to the side of the nose bleeding and the nose continued to bleed for approximately 15 to 30 minutes.  Clot informed and she presented to the emergency department for further evaluation.  Patient states she blew her nose upon arrival in the emergency room the clot was removed and she has had no further bleeding.    PAST MEDICAL HISTORY  Active Ambulatory Problems     Diagnosis Date Noted   • Type 2 diabetes mellitus (HCC) 07/27/2021   • Essential hypertension 07/27/2021   • Hyperlipemia 07/27/2021   • Abnormal liver CT 07/28/2021   • History of recent stroke 08/12/2021   • Intracranial vascular stenosis 08/12/2021     Resolved Ambulatory Problems     Diagnosis Date Noted   • Acute CVA (cerebrovascular accident) (HCC) 07/27/2021     Past Medical History:   Diagnosis Date   • Diabetes mellitus (HCC)    • Hyperlipidemia    • Hypertension          PAST SURGICAL HISTORY  No past surgical history on file.      FAMILY HISTORY  No family history on file.      SOCIAL HISTORY  Social History     Socioeconomic History   • Marital status:    Tobacco Use   • Smoking status: Never Smoker   • Smokeless tobacco: Never Used   Vaping Use   • Vaping Use: Never used   Substance and Sexual Activity   • Alcohol use: Never   • Drug use: Never   • Sexual activity: Defer     Birth control/protection: Implant         ALLERGIES  Ibuprofen        REVIEW OF SYSTEMS  Review of Systems   Constitutional: Negative.    HENT:  Positive for nosebleeds.    Respiratory: Negative.    Cardiovascular: Negative.    Gastrointestinal: Negative.    Genitourinary: Negative.    Musculoskeletal: Negative.    Neurological: Negative for dizziness and light-headedness.   Psychiatric/Behavioral: Negative.         All systems reviewed and negative except for those discussed in HPI.       PHYSICAL EXAM    I have reviewed the triage vital signs and nursing notes.    ED Triage Vitals [01/06/22 2221]   Temp Heart Rate Resp BP SpO2   97.6 °F (36.4 °C) 83 18 (!) 182/115 98 %      Temp src Heart Rate Source Patient Position BP Location FiO2 (%)   -- -- Standing Left arm --       Physical Exam  GENERAL: WDWN female no acute distress  HENT: nares patent, slight right-sided epistaxis appears to be anterior.    EYES: no scleral icterus  CV: regular rhythm, regular rate  RESPIRATORY: normal effort  ABDOMEN: soft  MUSCULOSKELETAL: no deformity  NEURO: alert, moves all extremities, follows commands  SKIN: warm, dry        LAB RESULTS  No results found for this or any previous visit (from the past 24 hour(s)).    Ordered the above labs and independently reviewed the results.        RADIOLOGY  No Radiology Exams Resulted Within Past 24 Hours    I ordered the above noted radiological studies. Reviewed by me and discussed with radiologist.  See dictation for official radiology interpretation.      PROCEDURES    Epistaxis Management    Date/Time: 2/3/2022 3:08 AM  Performed by: Kdae Mauro III, PA  Authorized by: Vlad Mon MD     Consent:     Consent obtained:  Verbal    Consent given by:  Patient    Risks discussed:  Bleeding  Procedure details:     Treatment site:  R anterior    Treatment method:  Silver nitrate    Treatment complexity:  Limited    Treatment episode: initial    Post-procedure details:     Assessment:  Bleeding stopped    Patient tolerance of procedure:  Procedure terminated at patient's request          MEDICATIONS GIVEN IN  ER    Medications   lidocaine 1% - EPINEPHrine 1:[id number] (XYLOCAINE W/EPI) 1 %-1:156074 injection 10 mL (10 mL Injection Given 1/6/22 2334)   silver nitrate 75-25 % applicator 1 application (1 application Topical Given 1/6/22 2334)         PROGRESS, DATA ANALYSIS, CONSULTS, AND MEDICAL DECISION MAKING    All labs have been independently reviewed by me.  All radiology studies have been reviewed by me and discussed with radiologist dictating the report.   EKG's independently viewed and interpreted by me.  Discussion below represents my analysis of pertinent findings related to patient's condition, differential diagnosis, treatment plan and final disposition.        ED Course as of 02/03/22 0308   Thu Jan 06, 2022 2256 BP(!): 182/115 [RC]   2256 Temp: 97.6 °F (36.4 °C) [RC]   2256 Heart Rate: 83 [RC]   2256 Resp: 18 [RC]   2256 SpO2: 98 %  RA [RC]      ED Course User Index  [RC] Kade Mauro III, PA           PPE: The patient wore a surgical mask throughout the entire patient encounter. I wore an N95.    AS OF 03:08 EST VITALS:    BP - 159/100  HR - 83  TEMP - 97.6 °F (36.4 °C)  O2 SATS - 98%        DIAGNOSIS  Final diagnoses:   Right-sided epistaxis         DISPOSITION  DISCHARGE    Patient discharged in stable condition.    Reviewed implications of results, diagnosis, meds, responsibility to follow up, warning signs and symptoms of possible worsening, potential complications and reasons to return to ER.    Patient/Family voiced understanding of above instructions.    Discussed plan for discharge, as there is no emergent indication for admission. Patient referred to primary care provider for BP management due to today's BP. Pt/family is agreeable and understands need for follow up and repeat testing.  Pt is aware that discharge does not mean that nothing is wrong but it indicates no emergency is present that requires admission and they must continue care with follow-up as given below or physician of their  choice.     FOLLOW-UP  Benjamín Marquez MD  [address]  [place] [id number]  [phone]    Schedule an appointment as soon as possible for a visit  For further evaluation and treatment, As needed         Medication List      No changes were made to your prescriptions during this visit.                Kade Mauro III, PA  02/03/22 4747

## 2023-12-04 ENCOUNTER — OFFICE VISIT (OUTPATIENT)
Dept: NEUROLOGY | Facility: CLINIC | Age: 43
End: 2023-12-04

## 2023-12-04 VITALS
SYSTOLIC BLOOD PRESSURE: 130 MMHG | WEIGHT: 242 LBS | OXYGEN SATURATION: 99 % | HEART RATE: 83 BPM | BODY MASS INDEX: 41.32 KG/M2 | DIASTOLIC BLOOD PRESSURE: 70 MMHG | HEIGHT: 64 IN

## 2023-12-04 DIAGNOSIS — Z86.73 HISTORY OF STROKE: Primary | ICD-10-CM

## 2023-12-04 DIAGNOSIS — I67.9 INTRACRANIAL VASCULAR STENOSIS: ICD-10-CM

## 2023-12-04 PROCEDURE — 99213 OFFICE O/P EST LOW 20 MIN: CPT | Performed by: PSYCHIATRY & NEUROLOGY

## 2023-12-04 RX ORDER — LANOLIN ALCOHOL/MO/W.PET/CERES
1000 CREAM (GRAM) TOPICAL DAILY
COMMUNITY

## 2023-12-04 RX ORDER — GLIPIZIDE 10 MG/1
10 TABLET ORAL
COMMUNITY

## 2023-12-04 RX ORDER — CLOPIDOGREL BISULFATE 75 MG/1
75 TABLET ORAL DAILY
COMMUNITY

## 2023-12-04 NOTE — ASSESSMENT & PLAN NOTE
Continue optimal medical management including aspirin/Plavix and high dose atorvastatin.  Evaluated for intervention as well in the past.

## 2023-12-04 NOTE — LETTER
December 4, 2023     SAM Rowan  [address] [address]    Patient: Reena García   YOB: 1980   Date of Visit: 12/4/2023     Dear SAM Rowan:       Thank you for referring Reena García to me for evaluation. Below are the relevant portions of my assessment and plan of care.    If you have questions, please do not hesitate to call me. I look forward to following Reena along with you.         Sincerely,        Caterina Medellin MD        CC: No Recipients    Caterina Medellin MD  12/04/23 1030  Sign when Signing Visit  Chief Complaint  Stroke    Subjective         Reena García presents to [facility] for   HISTORY OF PRESENT ILLNESS:    Reena García is a 43 year old right handed woman who returns to neurology clinic for follow up evaluation and follow up of hospitalization for strokes.   was used for interpreting for her today and assisting with the interview.  She reports initially in June 2021 she started noticing symptoms in her left hand including numbness and difficulty using the TV remote with her left hand.  Then on July 14th she started feeling like her left hand was stuck and was having trouble opening the lock with a key.  She went to the ED on 7/27/2021 and her initial CT imaging of the brain without contrast performed at [facility] revealed an area of decreased attenuation in the right basal ganglia and an adjacent lesion in the superior R frontal lobe. These findings were concerning for brain neoplasm.  But then she had the brain MRI scan done which demonstrates strokes.       Brain MRI: There is a 3 x 2.2 x 2.5 cm early subacute infarct involving the head and body of the right caudate nucleus extending through the anterior limb and genu of the right internal capsule and throughout the majority of the right putamen that has some methemoglobin deposition from some subacute  petechial hemorrhage into it. Furthermore, there are multiple small nodular crescentic and patchy areas of acute to early subacute infarction involving the posterior superior right frontal cortex and a thin band in the anterior superior right parietal cortex as well as involving the superior right frontal parietal subcortical white matter and a small 5 mm nodular focus in the anterior lateral right frontal juxtacortical white matter. Many of these demonstrate some faint enhancement as does the right basal ganglion subacute infarct and these are enhancing early subacute infarcts in the right middle cerebral artery territory. Furthermore there is a tiny focus of signal abnormality at the right internal carotid terminus extending into the origin of the M1 segment of the right middle cerebral artery. This could potentially be  thrombus. I recommend a CT angiogram of the head and neck, CT perfusion study of the head for a more comprehensive assessment. Furthermore, in a 40-year-old patient, the most common etiologies for embolic events to the right MCA territory include carotid dissection and a patent foramen ovale. Further evaluation of the heart with an echocardiogram is recommended.     She had a CTA of her head and neck done which was read as: 1. Previously identified areas of infarction within the right middle cerebral hemisphere are stable. 2. No cervical vascular stenosis or occlusion. 3. Patient does appear to have occlusion or near occlusive stenosis of the right M1 proximally, although there is reconstitution at its midportion. Overall, there is diminished enhancement and caliber of the right middle cerebral artery vessels when compared to the left. 4. Occlusion of the right M1, although there is reconstitution of the right M2 by the anterior communicating artery.     Echocardiogram with 59% EF and looked good.  It was decided that she be on optimum maximized medical therapy including aspirin 81 mg along with  clopidogrel and atorvastatin prior to considering any type of vascular intervention.  She denies any new stroke symptoms since being discharged from the hospital.  No blood in her stool or urine.  She has been taking the dual antiplatelet therapy without any issues.  She does not smoke.  Her BP is well controlled today.  She denies any family history of strokes.  She continues to have some weakness with her left hand .  She denies getting occupational therapy for the left hand weakness.  Her HgbA1c was 8.1% and she tells me her most recent one was 6.7% and I advised her to follow up with her PCP.  I referred her to OT on initial visit which she did not complete as she reports not hearing form them and would like to be referred again.      Past Medical History:   Diagnosis Date   • Diabetes mellitus    • Hyperlipidemia    • Hypertension         History reviewed. No pertinent family history.     Social History     Socioeconomic History   • Marital status:    Tobacco Use   • Smoking status: Never   • Smokeless tobacco: Never   Vaping Use   • Vaping Use: Never used   Substance and Sexual Activity   • Alcohol use: Never   • Drug use: Never   • Sexual activity: Defer     Birth control/protection: Implant        I have reviewed and confirmed the accuracy of the ROS as documented by the MA/LPN/RN Caterina Medellin MD   Review of Systems   Eyes:  Negative for blurred vision, double vision, photophobia, pain, discharge, redness, itching and visual disturbance.   Neurological:  Positive for weakness. Negative for dizziness, tremors, seizures, syncope, facial asymmetry, speech difficulty, light-headedness, numbness, headache, memory problem and confusion.   Psychiatric/Behavioral:  Negative for agitation, behavioral problems, decreased concentration, dysphoric mood, hallucinations, self-injury, sleep disturbance, suicidal ideas, negative for hyperactivity, depressed mood and stress. The patient is not nervous/anxious.   "       Objective  Vital Signs:   /70   Pulse 83   Ht 162.6 cm (64.02\")   Wt 110 kg (242 lb)   SpO2 99%   BMI 41.52 kg/m²       PHYSICAL EXAM:    General   Mental Status - Alert. General Appearance - Well developed, Well groomed, Oriented and Cooperative. Orientation - Oriented X3.       Head and Neck  Head - normocephalic, atraumatic with no lesions or palpable masses.  Neck    Global Assessment - supple.       Eye   Sclera/Conjunctiva - Bilateral - Normal.    ENMT  Mouth and Throat   Oral Cavity/Oropharynx: Oropharynx - the soft palate,uvula and tongue are normal in appearance.    Chest and Lung Exam   Chest - lung clear to auscultation bilaterally.    Cardiovascular   Cardiovascular examination reveals  - normal heart sounds, regular rate and rhythm.    Neurologic   Mental Status: Speech - Normal. Cognitive function - appropriate fund of knowledge. No impairment of attention, Impairment of concentration, impairment of long term memory or impairment of short term memory.  Cranial Nerves:   II Optic: Visual acuity - Left - Normal. Right - Normal. Visual fields - Normal (to confrontation).  III Oculomotor: Pupillary constriction - Left - Normal. Right - Normal.  VII Facial: - Very mild left facial drooping on activation.   VIII Acoustic - Bilateral - Hearing normal and (Hearing tested by finger rub).   IX Glossopharyngeal / X Vagus - Normal.  XI Accessory: Trapezius - Bilateral - Normal. Sternocleidomastoid - Bilateral - Normal.  XII Hypoglossal - Bilateral - Normal.  Eye Movements: - Normal Bilaterally.  Sensory:   Light Touch: Intact - Globally.  Motor:   Bulk and Contour: - Normal.  Tone: - Normal.  Tremor: Not present.  Strength: 5/5 normal muscle strength - other than left hand with mild weakness 4+/5.            General Assessment of Reflexes: - deep tendon reflexes are more brisk on the left side. Coordination - No Impairment of finger-to-nose or Impairment of rapid alternating movements. Gait - " Normal.     Result Review:                 Assessment and Plan    Problem List Items Addressed This Visit          Neuro    History of stroke - Primary    Current Assessment & Plan     43 year old right handed woman with history of multifocal right hemispheric MCA strokes and intracranial vascular stenosis.   was used for interpreting for her today and assisting with the interview.  Work up is completed as mentioned above.  She is doing well with combination of baby aspirin and Plavix along with high dose statin.  Denies any new stroke symptoms.  No blood in her stool or urine.  She has been taking the dual antiplatelet therapy without any issues.  She does not smoke.  Her BP is well controlled today.  She denies any family history of strokes.  She continues to have some weakness with her left hand .  She denies getting occupational therapy for the left hand weakness.  Her HgbA1c was 8.1% and she tells me her most recent one was 6.7% and I advised her to follow up with her PCP.  I referred her to OT on initial visit which she did not complete as she reports not hearing form them and would like to be referred again.  I will place the referral again today for OT/PT for further assistance.           Relevant Orders    Ambulatory Referral to Occupational Therapy    Ambulatory Referral to Physical Therapy Evaluate and treat    Intracranial vascular stenosis    Current Assessment & Plan     Continue optimal medical management including aspirin/Plavix and high dose atorvastatin.  Evaluated for intervention as well in the past.             Follow Up   Return if symptoms worsen or fail to improve.  Patient was given instructions and counseling regarding her condition or for health maintenance advice. Please see specific information pulled into the AVS if appropriate.

## 2023-12-04 NOTE — PROGRESS NOTES
Chief Complaint  Stroke    Subjective          Reena García presents to [facility] for   HISTORY OF PRESENT ILLNESS:    Reena García is a 43 year old right handed woman who returns to neurology clinic for follow up evaluation and follow up of hospitalization for strokes.   was used for interpreting for her today and assisting with the interview.  She reports initially in June 2021 she started noticing symptoms in her left hand including numbness and difficulty using the TV remote with her left hand.  Then on July 14th she started feeling like her left hand was stuck and was having trouble opening the lock with a key.  She went to the ED on 7/27/2021 and her initial CT imaging of the brain without contrast performed at [facility] revealed an area of decreased attenuation in the right basal ganglia and an adjacent lesion in the superior R frontal lobe. These findings were concerning for brain neoplasm.  But then she had the brain MRI scan done which demonstrates strokes.       Brain MRI: There is a 3 x 2.2 x 2.5 cm early subacute infarct involving the head and body of the right caudate nucleus extending through the anterior limb and genu of the right internal capsule and throughout the majority of the right putamen that has some methemoglobin deposition from some subacute petechial hemorrhage into it. Furthermore, there are multiple small nodular crescentic and patchy areas of acute to early subacute infarction involving the posterior superior right frontal cortex and a thin band in the anterior superior right parietal cortex as well as involving the superior right frontal parietal subcortical white matter and a small 5 mm nodular focus in the anterior lateral right frontal juxtacortical white matter. Many of these demonstrate some faint enhancement as does the right basal ganglion subacute infarct and these are enhancing early subacute infarcts  in the right middle cerebral artery territory. Furthermore there is a tiny focus of signal abnormality at the right internal carotid terminus extending into the origin of the M1 segment of the right middle cerebral artery. This could potentially be  thrombus. I recommend a CT angiogram of the head and neck, CT perfusion study of the head for a more comprehensive assessment. Furthermore, in a 40-year-old patient, the most common etiologies for embolic events to the right MCA territory include carotid dissection and a patent foramen ovale. Further evaluation of the heart with an echocardiogram is recommended.     She had a CTA of her head and neck done which was read as: 1. Previously identified areas of infarction within the right middle cerebral hemisphere are stable. 2. No cervical vascular stenosis or occlusion. 3. Patient does appear to have occlusion or near occlusive stenosis of the right M1 proximally, although there is reconstitution at its midportion. Overall, there is diminished enhancement and caliber of the right middle cerebral artery vessels when compared to the left. 4. Occlusion of the right M1, although there is reconstitution of the right M2 by the anterior communicating artery.     Echocardiogram with 59% EF and looked good.  It was decided that she be on optimum maximized medical therapy including aspirin 81 mg along with clopidogrel and atorvastatin prior to considering any type of vascular intervention.  She denies any new stroke symptoms since being discharged from the hospital.  No blood in her stool or urine.  She has been taking the dual antiplatelet therapy without any issues.  She does not smoke.  Her BP is well controlled today.  She denies any family history of strokes.  She continues to have some weakness with her left hand .  She denies getting occupational therapy for the left hand weakness.  Her HgbA1c was 8.1% and she tells me her most recent one was 6.7% and I advised her to  "follow up with her PCP.  I referred her to OT on initial visit which she did not complete as she reports not hearing form them and would like to be referred again.      Past Medical History:   Diagnosis Date    Diabetes mellitus     Hyperlipidemia     Hypertension         History reviewed. No pertinent family history.     Social History     Socioeconomic History    Marital status:    Tobacco Use    Smoking status: Never    Smokeless tobacco: Never   Vaping Use    Vaping Use: Never used   Substance and Sexual Activity    Alcohol use: Never    Drug use: Never    Sexual activity: Defer     Birth control/protection: Implant        I have reviewed and confirmed the accuracy of the ROS as documented by the MA/LPN/RN Caterina Medellin MD   Review of Systems   Eyes:  Negative for blurred vision, double vision, photophobia, pain, discharge, redness, itching and visual disturbance.   Neurological:  Positive for weakness. Negative for dizziness, tremors, seizures, syncope, facial asymmetry, speech difficulty, light-headedness, numbness, headache, memory problem and confusion.   Psychiatric/Behavioral:  Negative for agitation, behavioral problems, decreased concentration, dysphoric mood, hallucinations, self-injury, sleep disturbance, suicidal ideas, negative for hyperactivity, depressed mood and stress. The patient is not nervous/anxious.         Objective   Vital Signs:   /70   Pulse 83   Ht 162.6 cm (64.02\")   Wt 110 kg (242 lb)   SpO2 99%   BMI 41.52 kg/m²       PHYSICAL EXAM:    General   Mental Status - Alert. General Appearance - Well developed, Well groomed, Oriented and Cooperative. Orientation - Oriented X3.       Head and Neck  Head - normocephalic, atraumatic with no lesions or palpable masses.  Neck    Global Assessment - supple.       Eye   Sclera/Conjunctiva - Bilateral - Normal.    ENMT  Mouth and Throat   Oral Cavity/Oropharynx: Oropharynx - the soft palate,uvula and tongue are normal in " appearance.    Chest and Lung Exam   Chest - lung clear to auscultation bilaterally.    Cardiovascular   Cardiovascular examination reveals  - normal heart sounds, regular rate and rhythm.    Neurologic   Mental Status: Speech - Normal. Cognitive function - appropriate fund of knowledge. No impairment of attention, Impairment of concentration, impairment of long term memory or impairment of short term memory.  Cranial Nerves:   II Optic: Visual acuity - Left - Normal. Right - Normal. Visual fields - Normal (to confrontation).  III Oculomotor: Pupillary constriction - Left - Normal. Right - Normal.  VII Facial: - Very mild left facial drooping on activation.   VIII Acoustic - Bilateral - Hearing normal and (Hearing tested by finger rub).   IX Glossopharyngeal / X Vagus - Normal.  XI Accessory: Trapezius - Bilateral - Normal. Sternocleidomastoid - Bilateral - Normal.  XII Hypoglossal - Bilateral - Normal.  Eye Movements: - Normal Bilaterally.  Sensory:   Light Touch: Intact - Globally.  Motor:   Bulk and Contour: - Normal.  Tone: - Normal.  Tremor: Not present.  Strength: 5/5 normal muscle strength - other than left hand with mild weakness 4+/5.            General Assessment of Reflexes: - deep tendon reflexes are more brisk on the left side. Coordination - No Impairment of finger-to-nose or Impairment of rapid alternating movements. Gait - Normal.     Result Review :                 Assessment and Plan    Problem List Items Addressed This Visit          Neuro    History of stroke - Primary    Current Assessment & Plan     43 year old right handed woman with history of multifocal right hemispheric MCA strokes and intracranial vascular stenosis.   was used for interpreting for her today and assisting with the interview.  Work up is completed as mentioned above.  She is doing well with combination of baby aspirin and Plavix along with high dose statin.  Denies any new stroke symptoms.  No blood in her  stool or urine.  She has been taking the dual antiplatelet therapy without any issues.  She does not smoke.  Her BP is well controlled today.  She denies any family history of strokes.  She continues to have some weakness with her left hand .  She denies getting occupational therapy for the left hand weakness.  Her HgbA1c was 8.1% and she tells me her most recent one was 6.7% and I advised her to follow up with her PCP.  I referred her to OT on initial visit which she did not complete as she reports not hearing form them and would like to be referred again.  I will place the referral again today for OT/PT for further assistance.           Relevant Orders    Ambulatory Referral to Occupational Therapy    Ambulatory Referral to Physical Therapy Evaluate and treat    Intracranial vascular stenosis    Current Assessment & Plan     Continue optimal medical management including aspirin/Plavix and high dose atorvastatin.  Evaluated for intervention as well in the past.             Follow Up   Return if symptoms worsen or fail to improve.  Patient was given instructions and counseling regarding her condition or for health maintenance advice. Please see specific information pulled into the AVS if appropriate.

## 2023-12-04 NOTE — ASSESSMENT & PLAN NOTE
43 year old right handed woman with history of multifocal right hemispheric MCA strokes and intracranial vascular stenosis.   was used for interpreting for her today and assisting with the interview.  Work up is completed as mentioned above.  She is doing well with combination of baby aspirin and Plavix along with high dose statin.  Denies any new stroke symptoms.  No blood in her stool or urine.  She has been taking the dual antiplatelet therapy without any issues.  She does not smoke.  Her BP is well controlled today.  She denies any family history of strokes.  She continues to have some weakness with her left hand .  She denies getting occupational therapy for the left hand weakness.  Her HgbA1c was 8.1% and she tells me her most recent one was 6.7% and I advised her to follow up with her PCP.  I referred her to OT on initial visit which she did not complete as she reports not hearing form them and would like to be referred again.  I will place the referral again today for OT/PT for further assistance.

## 2023-12-04 NOTE — PATIENT INSTRUCTIONS
Lower blood pressure by restricting salt in diet (less than 2400 mg/day), weight loss, increase fruits, vegetables, whole grains, and low-fat dairy products.    Consider the DASH diet or Mediterranean diet.  Increase fish and poultry intake.    Avoid sodas, sweets, and red meat.    Limit alcohol consumption.    Monitor and keep blood pressure, cholesterol and blood sugar at goal.    Avoid the use of tobacco and avoid secondhand smoke.    Engage in moderate to vigorous intensity aerobic exercise for about 40 minutes 3-4 times per week.  Consider lower intensity ariela chi or yoga if necessary.    Take antiplatelet medication regularly.    If you snore, wake up unrefreshed or feel tired during the day, talk to your doctor as these may be signs of sleep apnea and a sleep study may be indicated.